# Patient Record
Sex: FEMALE | Race: WHITE | NOT HISPANIC OR LATINO | Employment: OTHER | URBAN - METROPOLITAN AREA
[De-identification: names, ages, dates, MRNs, and addresses within clinical notes are randomized per-mention and may not be internally consistent; named-entity substitution may affect disease eponyms.]

---

## 2017-01-09 RX ORDER — AMOXICILLIN 500 MG/1
500 CAPSULE ORAL 3 TIMES DAILY
Qty: 30 CAPSULE | Refills: 0 | Status: ON HOLD | OUTPATIENT
Start: 2017-01-09 | End: 2017-01-10

## 2017-01-10 ENCOUNTER — HOSPITAL ENCOUNTER (OUTPATIENT)
Facility: HOSPITAL | Age: 82
Setting detail: OBSERVATION
LOS: 1 days | Discharge: HOME | End: 2017-01-11
Attending: EMERGENCY MEDICINE | Admitting: INTERNAL MEDICINE

## 2017-01-10 DIAGNOSIS — E87.20 LACTIC ACIDOSIS: ICD-10-CM

## 2017-01-10 DIAGNOSIS — K92.2 ACUTE GI BLEEDING: ICD-10-CM

## 2017-01-10 DIAGNOSIS — E87.6 HYPOKALEMIA: Primary | ICD-10-CM

## 2017-01-10 PROBLEM — N17.9 AKI (ACUTE KIDNEY INJURY) (HCC): Status: ACTIVE | Noted: 2017-01-10

## 2017-01-10 PROBLEM — K62.5 RECTAL BLEEDING: Status: ACTIVE | Noted: 2017-01-10

## 2017-01-10 LAB
ABO GROUP BLD: NORMAL
ALBUMIN SERPL-MCNC: 4.6 G/DL (ref 3.5–5.2)
ALBUMIN/GLOB SERPL: 1.5 G/DL
ALP SERPL-CCNC: 102 U/L (ref 39–117)
ALT SERPL W P-5'-P-CCNC: 7 U/L (ref 1–33)
ANION GAP SERPL CALCULATED.3IONS-SCNC: 18.7 MMOL/L
AST SERPL-CCNC: 20 U/L (ref 1–32)
BASOPHILS # BLD AUTO: 0.01 10*3/MM3 (ref 0–0.2)
BASOPHILS NFR BLD AUTO: 0.1 % (ref 0–1.5)
BILIRUB SERPL-MCNC: 1.1 MG/DL (ref 0.1–1.2)
BLD GP AB SCN SERPL QL: NEGATIVE
BUN BLD-MCNC: 16 MG/DL (ref 8–23)
BUN/CREAT SERPL: 13.4 (ref 7–25)
CALCIUM SPEC-SCNC: 10.2 MG/DL (ref 8.6–10.5)
CHLORIDE SERPL-SCNC: 86 MMOL/L (ref 98–107)
CO2 SERPL-SCNC: 27.3 MMOL/L (ref 22–29)
CREAT BLD-MCNC: 1.19 MG/DL (ref 0.57–1)
D-LACTATE SERPL-SCNC: 1.9 MMOL/L (ref 0.5–2)
D-LACTATE SERPL-SCNC: 3.7 MMOL/L (ref 0.5–2)
DEPRECATED RDW RBC AUTO: 45.2 FL (ref 37–54)
EOSINOPHIL # BLD AUTO: 0.06 10*3/MM3 (ref 0–0.7)
EOSINOPHIL NFR BLD AUTO: 0.8 % (ref 0.3–6.2)
ERYTHROCYTE [DISTWIDTH] IN BLOOD BY AUTOMATED COUNT: 13.7 % (ref 11.7–13)
GFR SERPL CREATININE-BSD FRML MDRD: 43 ML/MIN/1.73
GLOBULIN UR ELPH-MCNC: 3 GM/DL
GLUCOSE BLD-MCNC: 196 MG/DL (ref 65–99)
HCT VFR BLD AUTO: 37 % (ref 35.6–45.5)
HCT VFR BLD AUTO: 40.8 % (ref 35.6–45.5)
HGB BLD-MCNC: 12.3 G/DL (ref 11.9–15.5)
HGB BLD-MCNC: 13.8 G/DL (ref 11.9–15.5)
HOLD SPECIMEN: NORMAL
HOLD SPECIMEN: NORMAL
IMM GRANULOCYTES # BLD: 0.02 10*3/MM3 (ref 0–0.03)
IMM GRANULOCYTES NFR BLD: 0.3 % (ref 0–0.5)
LYMPHOCYTES # BLD AUTO: 1.48 10*3/MM3 (ref 0.9–4.8)
LYMPHOCYTES NFR BLD AUTO: 19.7 % (ref 19.6–45.3)
MCH RBC QN AUTO: 30.9 PG (ref 26.9–32)
MCHC RBC AUTO-ENTMCNC: 33.8 G/DL (ref 32.4–36.3)
MCV RBC AUTO: 91.3 FL (ref 80.5–98.2)
MONOCYTES # BLD AUTO: 0.91 10*3/MM3 (ref 0.2–1.2)
MONOCYTES NFR BLD AUTO: 12.1 % (ref 5–12)
NEUTROPHILS # BLD AUTO: 5.03 10*3/MM3 (ref 1.9–8.1)
NEUTROPHILS NFR BLD AUTO: 67 % (ref 42.7–76)
PLATELET # BLD AUTO: 186 10*3/MM3 (ref 140–500)
PMV BLD AUTO: 13.2 FL (ref 6–12)
POTASSIUM BLD-SCNC: 3.3 MMOL/L (ref 3.5–5.2)
PROT SERPL-MCNC: 7.6 G/DL (ref 6–8.5)
RBC # BLD AUTO: 4.47 10*6/MM3 (ref 3.9–5.2)
RH BLD: POSITIVE
SODIUM BLD-SCNC: 132 MMOL/L (ref 136–145)
WBC NRBC COR # BLD: 7.51 10*3/MM3 (ref 4.5–10.7)
WHOLE BLOOD HOLD SPECIMEN: NORMAL
WHOLE BLOOD HOLD SPECIMEN: NORMAL

## 2017-01-10 PROCEDURE — 85025 COMPLETE CBC W/AUTO DIFF WBC: CPT | Performed by: EMERGENCY MEDICINE

## 2017-01-10 PROCEDURE — 96361 HYDRATE IV INFUSION ADD-ON: CPT

## 2017-01-10 PROCEDURE — 25810000003 SODIUM CHLORIDE 0.9 % WITH KCL 20 MEQ 20-0.9 MEQ/L-% SOLUTION: Performed by: INTERNAL MEDICINE

## 2017-01-10 PROCEDURE — 86901 BLOOD TYPING SEROLOGIC RH(D): CPT

## 2017-01-10 PROCEDURE — 83605 ASSAY OF LACTIC ACID: CPT | Performed by: EMERGENCY MEDICINE

## 2017-01-10 PROCEDURE — 86900 BLOOD TYPING SEROLOGIC ABO: CPT

## 2017-01-10 PROCEDURE — G0378 HOSPITAL OBSERVATION PER HR: HCPCS

## 2017-01-10 PROCEDURE — 99222 1ST HOSP IP/OBS MODERATE 55: CPT | Performed by: INTERNAL MEDICINE

## 2017-01-10 PROCEDURE — 85014 HEMATOCRIT: CPT | Performed by: INTERNAL MEDICINE

## 2017-01-10 PROCEDURE — 80053 COMPREHEN METABOLIC PANEL: CPT | Performed by: EMERGENCY MEDICINE

## 2017-01-10 PROCEDURE — 85018 HEMOGLOBIN: CPT | Performed by: INTERNAL MEDICINE

## 2017-01-10 PROCEDURE — 86850 RBC ANTIBODY SCREEN: CPT

## 2017-01-10 PROCEDURE — 96374 THER/PROPH/DIAG INJ IV PUSH: CPT

## 2017-01-10 PROCEDURE — 99285 EMERGENCY DEPT VISIT HI MDM: CPT

## 2017-01-10 RX ORDER — SODIUM CHLORIDE 0.9 % (FLUSH) 0.9 %
1-10 SYRINGE (ML) INJECTION AS NEEDED
Status: DISCONTINUED | OUTPATIENT
Start: 2017-01-10 | End: 2017-01-11 | Stop reason: HOSPADM

## 2017-01-10 RX ORDER — ONDANSETRON 2 MG/ML
4 INJECTION INTRAMUSCULAR; INTRAVENOUS EVERY 6 HOURS PRN
Status: DISCONTINUED | OUTPATIENT
Start: 2017-01-10 | End: 2017-01-11 | Stop reason: HOSPADM

## 2017-01-10 RX ORDER — POTASSIUM CHLORIDE 750 MG/1
40 CAPSULE, EXTENDED RELEASE ORAL ONCE
Status: COMPLETED | OUTPATIENT
Start: 2017-01-10 | End: 2017-01-10

## 2017-01-10 RX ORDER — PANTOPRAZOLE SODIUM 40 MG/1
40 TABLET, DELAYED RELEASE ORAL
Status: DISCONTINUED | OUTPATIENT
Start: 2017-01-11 | End: 2017-01-11 | Stop reason: HOSPADM

## 2017-01-10 RX ORDER — SODIUM CHLORIDE 9 MG/ML
125 INJECTION, SOLUTION INTRAVENOUS CONTINUOUS
Status: DISCONTINUED | OUTPATIENT
Start: 2017-01-10 | End: 2017-01-10

## 2017-01-10 RX ORDER — SODIUM CHLORIDE 0.9 % (FLUSH) 0.9 %
10 SYRINGE (ML) INJECTION AS NEEDED
Status: DISCONTINUED | OUTPATIENT
Start: 2017-01-10 | End: 2017-01-11 | Stop reason: HOSPADM

## 2017-01-10 RX ORDER — BUSPIRONE HYDROCHLORIDE 15 MG/1
15 TABLET ORAL EVERY 12 HOURS SCHEDULED
Status: DISCONTINUED | OUTPATIENT
Start: 2017-01-10 | End: 2017-01-11 | Stop reason: HOSPADM

## 2017-01-10 RX ORDER — CETIRIZINE HYDROCHLORIDE 10 MG/1
5 TABLET ORAL DAILY
Status: DISCONTINUED | OUTPATIENT
Start: 2017-01-10 | End: 2017-01-11 | Stop reason: HOSPADM

## 2017-01-10 RX ORDER — SODIUM CHLORIDE AND POTASSIUM CHLORIDE 150; 900 MG/100ML; MG/100ML
75 INJECTION, SOLUTION INTRAVENOUS CONTINUOUS
Status: DISCONTINUED | OUTPATIENT
Start: 2017-01-10 | End: 2017-01-11

## 2017-01-10 RX ORDER — ACETAMINOPHEN 325 MG/1
650 TABLET ORAL EVERY 4 HOURS PRN
Status: DISCONTINUED | OUTPATIENT
Start: 2017-01-10 | End: 2017-01-11 | Stop reason: HOSPADM

## 2017-01-10 RX ORDER — PANTOPRAZOLE SODIUM 40 MG/10ML
80 INJECTION, POWDER, LYOPHILIZED, FOR SOLUTION INTRAVENOUS ONCE
Status: COMPLETED | OUTPATIENT
Start: 2017-01-10 | End: 2017-01-10

## 2017-01-10 RX ADMIN — CETIRIZINE HYDROCHLORIDE 5 MG: 10 TABLET, FILM COATED ORAL at 15:14

## 2017-01-10 RX ADMIN — POTASSIUM CHLORIDE 40 MEQ: 750 CAPSULE, EXTENDED RELEASE ORAL at 19:07

## 2017-01-10 RX ADMIN — METOPROLOL TARTRATE 25 MG: 25 TABLET ORAL at 18:35

## 2017-01-10 RX ADMIN — POTASSIUM CHLORIDE AND SODIUM CHLORIDE 75 ML/HR: 900; 150 INJECTION, SOLUTION INTRAVENOUS at 15:15

## 2017-01-10 RX ADMIN — BUSPIRONE HYDROCHLORIDE 15 MG: 15 TABLET ORAL at 20:58

## 2017-01-10 RX ADMIN — SODIUM CHLORIDE 125 ML/HR: 9 INJECTION, SOLUTION INTRAVENOUS at 08:01

## 2017-01-10 RX ADMIN — PANTOPRAZOLE SODIUM 80 MG: 40 INJECTION, POWDER, FOR SOLUTION INTRAVENOUS at 08:04

## 2017-01-10 RX ADMIN — BUSPIRONE HYDROCHLORIDE 15 MG: 15 TABLET ORAL at 15:15

## 2017-01-10 NOTE — ED PROVIDER NOTES
EMERGENCY DEPARTMENT ENCOUNTER    CHIEF COMPLAINT  Chief Complaint: rectal bleeding  History given by: patient, family  History limited by: none  Room Number: 24/24  PMD: Raji Richardson MD  Cardiologist- Dr Andrade    HPI:  Pt is a 89 y.o. female who presents complaining of rectal bleeding. About 3 days ago, pt had 2 episodes during which she passed stools with red blood. Last night, she had 2 small episodes in which she passed black stool with dark red blood. She denies having rectal pain, N/V/D, fevers, chills, abd pain, pain and difficulty with urination, chest pain, and trouble breathing. Per family, pt was taken off of coumadin 3 months ago and is currently on 81mg ASA. They also state that pt had most recent colonoscopy over 6 years ago and has previous hx of GI bleeding. Pt has no other complaints at this time.     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   • Arthritis of knee 04/13/2016   • Cramp of limb 04/13/2016   • Congestive heart failure 04/13/2016   • Depression 04/13/2016   • Diverticulosis of intestine 04/13/2016   • Dysuria 04/13/2016   • Gastroesophageal reflux disease 04/13/2016   • Fever 04/13/2016   • Hyperglycemia 04/13/2016   • Hyperlipidemia 04/13/2016   • Impacted cerumen 04/13/2016   • Knee pain 04/13/2016   • Mitral valve insufficiency 04/13/2016   • Osteoporosis 04/13/2016   • Pacemaker 04/13/2016   • Urinary incontinence 04/13/2016   • Atrial fibrillation 04/13/2016   • Chronic a-fib 06/19/2016   • HTN (hypertension) 06/19/2016   • Gait instability  06/19/2016     Resolved Ambulatory Problems     Diagnosis Date Noted   • Anemia due to chronic kidney disease 04/13/2016   • Decubitus ulcer of sacral area 04/13/2016   • Iron deficiency anemia 04/13/2016   • Lower gastrointestinal hemorrhage 04/13/2016   • Sore throat 04/13/2016   • Acute lower GI bleeding 06/18/2016     Past Medical History   Diagnosis Date   • Allergic rhinitis    • Anemia in CKD (chronic kidney disease)    • Anxiety    • CHF (congestive heart failure)    • Diverticulosis    • GERD (gastroesophageal reflux disease)    • High risk medication use    • Hypertension    • Impacted cerumen of right ear    • Lower GI bleed    • Mitral regurgitation    • RBBB (right bundle branch block)    • Rheumatoid arthritis    • Thrombocytopenia          PAST SURGICAL HISTORY  Past Surgical History   Procedure Laterality Date   • Pacemaker implantation     • Colonoscopy       12- DR HORTA RECOMMENDED/DONE AT [facility]         FAMILY HISTORY  Family History   Problem Relation Age of Onset   • Cancer Mother    • Cancer Father    • Diabetes Daughter    • Cancer Other    • Diabetes Other    • Hypertension Other    • Heart attack Other          SOCIAL HISTORY  Social History     Social History   • Marital status:      Spouse name: N/A   • Number of children: N/A   • Years of education: N/A     Occupational History   • Not on file.     Social History Main Topics   • Smoking status: Former Smoker     Years: 5.00   • Smokeless tobacco: Never Used      Comment: CAFFEINE USE   • Alcohol use No   • Drug use: No   • Sexual activity: Defer     Other Topics Concern   • Not on file     Social History Narrative         ALLERGIES  Alendronate; Atorvastatin; Colesevelam hcl; Ezetimibe; Hydrochlorothiazide; Nitrofurantoin; and Statins        REVIEW OF SYSTEMS  Review of Systems   Constitutional: Negative for chills and fever.   HENT: Negative for sore throat and trouble swallowing.    Eyes: Negative for visual disturbance.    Respiratory: Negative for cough and shortness of breath.    Cardiovascular: Negative for chest pain, palpitations and leg swelling.   Gastrointestinal: Positive for blood in stool (red blood mixed with stool). Negative for abdominal pain, diarrhea and vomiting.        Black stool   Endocrine: Negative.    Genitourinary: Negative for decreased urine volume, dysuria and frequency.   Musculoskeletal: Negative for neck pain.   Skin: Negative for rash.   Allergic/Immunologic: Negative.    Neurological: Negative for syncope, weakness, numbness and headaches.   Hematological: Negative.    Psychiatric/Behavioral: Negative.    All other systems reviewed and are negative.           PHYSICAL EXAM  ED Triage Vitals   Temp Heart Rate Resp BP SpO2   01/10/17 0714 01/10/17 0714 01/10/17 0714 01/10/17 0728 01/10/17 0714   97 °F (36.1 °C) 65 16 156/74 97 % WNL      Temp src Heart Rate Source Patient Position BP Location FiO2 (%)   01/10/17 0714 01/10/17 0714 01/10/17 0728 01/10/17 0732 --   Tympanic Monitor Lying Left arm        Physical Exam   Constitutional: She is oriented to person, place, and time. She appears distressed (mild).   HENT:   Head: Normocephalic and atraumatic.   Eyes: EOM are normal.   Neck: Normal range of motion. Neck supple.   Cardiovascular: Normal rate, regular rhythm, normal heart sounds and intact distal pulses.    No murmur heard.  Pulses:       Posterior tibial pulses are 2+ on the right side, and 2+ on the left side.   Pulmonary/Chest: Effort normal and breath sounds normal. No respiratory distress.   Abdominal: Soft. Bowel sounds are normal. There is no tenderness. There is no rebound and no guarding.   Genitourinary: Rectal exam shows guaiac positive stool (heme positive dark red maroon stool in rectal vault).   Genitourinary Comments: no hemorrhoids     performed rectal exam with chaperone at pt's bedside   Musculoskeletal: Normal range of motion. She exhibits no edema.   Neurological: She is alert  and oriented to person, place, and time. She has normal sensation and normal strength.   Skin: Skin is warm and dry.   Psychiatric: Affect normal.   Nursing note and vitals reviewed.          LAB RESULTS  Recent Results (from the past 24 hour(s))   Comprehensive Metabolic Panel    Collection Time: 01/10/17  7:42 AM   Result Value Ref Range    Glucose 196 (H) 65 - 99 mg/dL    BUN 16 8 - 23 mg/dL    Creatinine 1.19 (H) 0.57 - 1.00 mg/dL    Sodium 132 (L) 136 - 145 mmol/L    Potassium 3.3 (L) 3.5 - 5.2 mmol/L    Chloride 86 (L) 98 - 107 mmol/L    CO2 27.3 22.0 - 29.0 mmol/L    Calcium 10.2 8.6 - 10.5 mg/dL    Total Protein 7.6 6.0 - 8.5 g/dL    Albumin 4.60 3.50 - 5.20 g/dL    ALT (SGPT) 7 1 - 33 U/L    AST (SGOT) 20 1 - 32 U/L    Alkaline Phosphatase 102 39 - 117 U/L    Total Bilirubin 1.1 0.1 - 1.2 mg/dL    eGFR Non African Amer 43 (L) >60 mL/min/1.73    Globulin 3.0 gm/dL    A/G Ratio 1.5 g/dL    BUN/Creatinine Ratio 13.4 7.0 - 25.0    Anion Gap 18.7 mmol/L   Type & Screen    Collection Time: 01/10/17  7:42 AM   Result Value Ref Range    ABO Type A     RH type Positive     Antibody Screen Negative    CBC Auto Differential    Collection Time: 01/10/17  7:42 AM   Result Value Ref Range    WBC 7.51 4.50 - 10.70 10*3/mm3    RBC 4.47 3.90 - 5.20 10*6/mm3    Hemoglobin 13.8 11.9 - 15.5 g/dL    Hematocrit 40.8 35.6 - 45.5 %    MCV 91.3 80.5 - 98.2 fL    MCH 30.9 26.9 - 32.0 pg    MCHC 33.8 32.4 - 36.3 g/dL    RDW 13.7 (H) 11.7 - 13.0 %    RDW-SD 45.2 37.0 - 54.0 fl    MPV 13.2 (H) 6.0 - 12.0 fL    Platelets 186 140 - 500 10*3/mm3    Neutrophil % 67.0 42.7 - 76.0 %    Lymphocyte % 19.7 19.6 - 45.3 %    Monocyte % 12.1 (H) 5.0 - 12.0 %    Eosinophil % 0.8 0.3 - 6.2 %    Basophil % 0.1 0.0 - 1.5 %    Immature Grans % 0.3 0.0 - 0.5 %    Neutrophils, Absolute 5.03 1.90 - 8.10 10*3/mm3    Lymphocytes, Absolute 1.48 0.90 - 4.80 10*3/mm3    Monocytes, Absolute 0.91 0.20 - 1.20 10*3/mm3    Eosinophils, Absolute 0.06 0.00 - 0.70  10*3/mm3    Basophils, Absolute 0.01 0.00 - 0.20 10*3/mm3    Immature Grans, Absolute 0.02 0.00 - 0.03 10*3/mm3   Lactic Acid, Plasma    Collection Time: 01/10/17  7:55 AM   Result Value Ref Range    Lactate 3.7 (C) 0.5 - 2.0 mmol/L       Ordered the above labs and reviewed the results.        PROCEDURES  Procedures      PROGRESS AND CONSULTS  ED Course     7:50 AM- Ordered IV fluids and protonix for GI bleed. Ordered blood work, lactic acid, and type and screen for further evaluation.     8:40 AM- Sent call out to [facility]. Admission decision time= 0840    8:41 AM- Lactic acid is elevated but is not suspicious for sepsis.     9:12 AM- Discussed case with Dr Titus ([facility] hospitalist)  Reviewed history, exam, results and treatments.  Discussed concerns and plan of care. Dr Titus accepts pt to be admitted to telemetry.    9:19 AM- Rechecked pt. BP is 127/59. HR is 60. O2 sat is 96% on room air. Discussed with pt about all pertinent results including heme positive maroon stool on rectal exam, normal hemoglobin, and elevated lactic acid. Discussed pt admission for further care and observation. Pt verbalizes understanding and agrees with plan. Pt is ready for admission.    9:40 AM- Reviewed orthostatics which are positive.       MEDICAL DECISION MAKING  Results were reviewed/discussed with the patient.     MDM  Number of Diagnoses or Management Options  Acute GI bleeding:   Hypokalemia:   Lactic acidosis:      Amount and/or Complexity of Data Reviewed  Clinical lab tests: ordered and reviewed (WBC= 7.51, hgb= 13.8, potassium= 3.3, lactic acid= 3.7)  Decide to obtain previous medical records or to obtain history from someone other than the patient: yes  Review and summarize past medical records: yes (Pt was admitted on 6/18/16 for acute GI bleed. At the time, she was seen by Dr Ruffin (GI). Pt also has past hx of diverticulosis and AVMs. )  Discuss the patient with other providers: yes (Dr Titus ([facility] hospitalist) will admit.  )    Patient Progress  Patient progress: stable           DIAGNOSIS  Final diagnoses:   Hypokalemia   Acute GI bleeding   Lactic acidosis         DISPOSITION  Admitted- telemetry    Discussed treatment plan and reason for admission with pt and admitting physician.  Pt voiced understanding of the plan for admission for further testing/treatment as needed.         Latest Documented Vital Signs:  As of 9:41 AM  BP- 138/71 HR- 62 Temp- 97.4 °F (36.3 °C) (Tympanic) O2 sat- 97%        --  Documentation assistance provided by norman Rodriguez for Dr Olson.  Information recorded by the scribe was done at my direction and has been verified and validated by me.       Arik Rodriguez  01/10/17 1005       Yara Olson MD  01/10/17 5091

## 2017-01-10 NOTE — CONSULTS
[facility]  Initial Inpatient Consult Note    Referring Provider: Dr. Oc England    Reason for Consultation: Rectal bleed    Subjective     History of present illness:  The patient is a very pleasant 89-year-old female with a history of gastrointestinal bleeding ×4 in her life with the most recent episode occurring in June 2016.  She presented with rectal bleeding three times since Saturday including once yesterday.  Her hemoglobin upon arrival is stable at 13.8 compared to last check in September 2016 when her hemoglobin was 12.9.  In June, she had rectal bleeding while on Coumadin with an INR 2.1 but her hemoglobin remained within normal limits and endoscopic evaluation was not performed.  Dr. Andrade, her cardiologist, subsequently stopped her Coumadin therapy and she is currently only on aspirin 81 mg daily.  In 2005, GI bleed, no scope done.  2011 rectal bleeding, diverticulosis, no source found.  Rectal bleeding 2015 diverticulosis, cecal AVMs treated with APC.   She denies other GI complaints, specifically no abdominal pain, dysphagia, odynophagia, skin lesions, nausea, vomiting, or melena.          Past Medical History:  Past Medical History   Diagnosis Date   • Allergic rhinitis    • Anemia in CKD (chronic kidney disease)    • Anxiety    • Arteriovenous malformation    • Atrial fibrillation    • CHF (congestive heart failure)    • Depression    • Diverticulosis    • Dysuria    • Fever    • GERD (gastroesophageal reflux disease)    • High risk medication use    • Hyperglycemia    • Hyperlipidemia    • Hypertension    • Impacted cerumen of right ear    • Iron deficiency anemia    • Knee pain    • Lower GI bleed    • Mitral regurgitation    • Osteoporosis    • RBBB (right bundle branch block)    • Rheumatoid arthritis    • Thrombocytopenia    • Urinary incontinence        Past Surgical History:  Past Surgical History   Procedure Laterality Date   • Pacemaker implantation     •  Colonoscopy       12- DR HORTA RECOMMENDED/DONE AT [facility]        Social History:   Social History   Substance Use Topics   • Smoking status: Former Smoker     Years: 5.00   • Smokeless tobacco: Never Used      Comment: CAFFEINE USE   • Alcohol use No        Family History:  Family History   Problem Relation Age of Onset   • Cancer Mother    • Cancer Father    • Diabetes Daughter    • Cancer Other    • Diabetes Other    • Hypertension Other    • Heart attack Other        Home Meds:  Prescriptions Prior to Admission   Medication Sig Dispense Refill Last Dose   • Ascorbic Acid (VITAMIN C) 500 MG capsule Take 1 capsule by mouth daily.   Past Week at Unknown time   • aspirin 81 MG EC tablet Take 1 tablet by mouth daily. 90 tablet 3 1/9/2017 at Unknown time   • busPIRone (BUSPAR) 15 MG tablet TAKE 1 TABLET TWICE DAILY 180 tablet 5 1/9/2017 at Unknown time   • Cholecalciferol (VITAMIN D-3) 1000 UNITS capsule Take 1 capsule by mouth daily.   1/9/2017 at Unknown time   • furosemide (LASIX) 40 MG tablet Take 1 tablet by mouth Daily. 90 tablet 3 1/9/2017 at Unknown time   • metoprolol tartrate (LOPRESSOR) 50 MG tablet Take 1 tablet by mouth 2 (two) times a day. (Patient taking differently: Take 25 mg by mouth 2 (two) times a day.) 180 tablet 3 1/9/2017 at Unknown time   • Multiple Vitamins-Minerals (CENTRUM SILVER) tablet Take 1 tablet by mouth daily.   1/9/2017 at Unknown time   • omeprazole (PriLOSEC) 40 MG capsule TAKE 1 CAPSULE BY MOUTH DAILY. 90 capsule 1 1/9/2017 at Unknown time   • Potassium Gluconate 550 MG tablet Take 1 tablet by mouth daily.   1/9/2017 at Unknown time   • Acetaminophen (TYLENOL EXTRA STRENGTH PO) Take  by mouth.   Unknown at Unknown time   • loratadine (CLARITIN) 10 MG tablet Take 10 mg by mouth daily.   Unknown at Unknown time       Current Meds:     busPIRone 15 mg Oral Q12H   cetirizine 5 mg Oral Daily   metoprolol tartrate 25 mg Oral BID   [START ON 1/11/2017] pantoprazole 40 mg Oral Q AM        Allergies:  Allergies   Allergen Reactions   • Alendronate    • Atorvastatin    • Colesevelam Hcl    • Ezetimibe    • Hydrochlorothiazide    • Nitrofurantoin    • Statins        Review of Systems  10 point review of systems is otherwise negative, pertinent positives are found in the history of present illness or subjective portion of this dictation     Objective     Vital Signs  Temp:  [97 °F (36.1 °C)-97.4 °F (36.3 °C)] 97.4 °F (36.3 °C)  Heart Rate:  [59-66] 60  Resp:  [14-16] 16  BP: (110-156)/(63-89) 140/68    Physical Exam:  General Appearance:    Alert, cooperative, in no acute distress   Head:    Normocephalic, without obvious abnormality, atraumatic   Eyes:            Lids and lashes normal, conjunctivae and sclerae normal, no   icterus   Throat:   No oral lesions, no thrush, oral mucosa moist   Neck:   No adenopathy, supple, trachea midline, no thyromegaly, no   carotid bruit, no JVD   Lungs:     Clear to auscultation,respirations regular, even and                   unlabored    Heart:    Regular rhythm and normal rate, normal S1 and S2, no            murmur, no gallop, no rub, no click   Chest Wall:    No abnormalities observed   Abdomen:     Normal bowel sounds, no masses, no organomegaly, soft        non-tender, non-distended, no guarding, no rebound                 tenderness   Rectal:     Deferred   Extremities:   no edema, no cyanosis, no redness   Skin:   No bleeding, bruising or rash   Lymph nodes:   No palpable adenopathy   Psychiatric:  Judgement and insight: normal   Orientation to person place and time: normal   Mood and affect: normal     Results Review:   I reviewed the patient's new clinical results.      Results from last 7 days  Lab Units 01/10/17  0742   WBC 10*3/mm3 7.51   HEMOGLOBIN g/dL 13.8   HEMATOCRIT % 40.8   PLATELETS 10*3/mm3 186         Results from last 7 days  Lab Units 01/10/17  0742   SODIUM mmol/L 132*   POTASSIUM mmol/L 3.3*   CHLORIDE mmol/L 86*   TOTAL CO2 mmol/L  27.3   BUN mg/dL 16   CREATININE mg/dL 1.19*   CALCIUM mg/dL 10.2   BILIRUBIN mg/dL 1.1   ALK PHOS U/L 102   ALT (SGPT) U/L 7   AST (SGOT) U/L 20   GLUCOSE mg/dL 196*               0  Lab Value Date/Time   LIPASE 73 (H) 06/10/2015 1008       Radiology:  Imaging Results (last 72 hours)     ** No results found for the last 72 hours. **          Assessment/Plan     Principal Problem:    Rectal bleeding  Active Problems:    Diverticulosis of intestine    Chronic a-fib    HTN (hypertension)    Hypokalemia    ALLY (acute kidney injury)    Acidosis          A/P  1.  Acute lower GI bleeding- likely diverticular, possibly hemorrhoidal, normal hemoglobin   2. diverticulosis  3. history of GI bleeding    - Hemodynamically stable with hemoglobin within normal limits  - Monitor for continued rectal bleeding  - Agree with serial H&H's and transfusions if needed.  - Discuss with patient and family we will defer colonoscopy at this time and proceed with conservative management      I discussed the patients findings and my recommendations with patient    Phill Ruffin MD  [facility]  01/10/17

## 2017-01-10 NOTE — IP AVS SNAPSHOT
AFTER VISIT SUMMARY             Cecelia Enriquez           About your hospitalization     You were admitted on:  January 10, 2017 You last received care in the:  [address]       Procedures & Surgeries         Medications    If you or your caregiver advised us that you are currently taking a medication and that medication is marked below as “Resume”, this simply indicates that we have reviewed those medications to make sure our new therapy recommendations do not interfere.  If you have concerns about medications other than those new ones which we are prescribing today, please consult the physician who prescribed them (or your primary physician).  Our review of your home medications is not meant to indicate that we are directing their use.             Your Medications      CHANGE how you take these medications     metoprolol tartrate 50 MG tablet   Take 1 tablet by mouth 2 (two) times a day.   Last time this was given:  1/11/2017  8:34 AM   According to our records, you may have been taking this medication differently.   Commonly known as:  LOPRESSOR   What changed:  how much to take   Next Dose Due:  This evening 1/11/2017             CONTINUE taking these medications     busPIRone 15 MG tablet   TAKE 1 TABLET TWICE DAILY   Last time this was given:  1/11/2017  8:34 AM   Commonly known as:  BUSPAR   Next Dose Due:  1/12/17 evening dose.           CENTRUM SILVER tablet   Take 1 tablet by mouth daily.   Next Dose Due:  1/11/2017   Notes to Patient:  You can take this today when you get home.           furosemide 40 MG tablet   Take 1 tablet by mouth Daily.   Commonly known as:  LASIX   Next Dose Due:  1/11/2017   Notes to Patient:  You can take this today when you get home.           loratadine 10 MG tablet   Take 10 mg by mouth daily.   Commonly known as:  CLARITIN   Next Dose Due:  1/11/2017   Notes to Patient:  You can take this today when you get home.           omeprazole 40 MG capsule      TAKE 1 CAPSULE BY MOUTH DAILY.   Commonly known as:  priLOSEC   Next Dose Due:  1/11/2017   Notes to Patient:  You can take this today when you get home.           Potassium Gluconate 550 MG tablet   Take 1 tablet by mouth daily.   Next Dose Due:  1/11/2017   Notes to Patient:  You can take this today when you get home.           TYLENOL EXTRA STRENGTH PO   Take  by mouth.   Notes to Patient:  Resume home med schedule.           Vitamin C 500 MG capsule   Take 1 capsule by mouth daily.   Next Dose Due:  1/11/2017   Notes to Patient:  You can take this today when you get home.           Vitamin D-3 1000 UNITS capsule   Take 1 capsule by mouth daily.   Next Dose Due:  1/11/2017   Notes to Patient:  You can take this today when you get home.             STOP taking these medications     aspirin 81 MG EC tablet                      Your Medications      Your Medication List           Morning Noon Evening Bedtime As Needed    busPIRone 15 MG tablet   TAKE 1 TABLET TWICE DAILY   Commonly known as:  BUSPAR                                      CENTRUM SILVER tablet   Take 1 tablet by mouth daily.   Notes to Patient:  You can take this today when you get home.                                   furosemide 40 MG tablet   Take 1 tablet by mouth Daily.   Commonly known as:  LASIX   Notes to Patient:  You can take this today when you get home.                                   loratadine 10 MG tablet   Take 10 mg by mouth daily.   Commonly known as:  CLARITIN   Notes to Patient:  You can take this today when you get home.                                   metoprolol tartrate 50 MG tablet   Take 1 tablet by mouth 2 (two) times a day.   Commonly known as:  LOPRESSOR                                      omeprazole 40 MG capsule   TAKE 1 CAPSULE BY MOUTH DAILY.   Commonly known as:  priLOSEC   Notes to Patient:  You can take this today when you get home.                                   Potassium Gluconate 550 MG tablet   Take 1  tablet by mouth daily.   Notes to Patient:  You can take this today when you get home.                                   TYLENOL EXTRA STRENGTH PO   Take  by mouth.   Notes to Patient:  Resume home med schedule.                                   Vitamin C 500 MG capsule   Take 1 capsule by mouth daily.   Notes to Patient:  You can take this today when you get home.                                   Vitamin D-3 1000 UNITS capsule   Take 1 capsule by mouth daily.   Notes to Patient:  You can take this today when you get home.                                            Instructions for After Discharge        Activity Instructions     Activity as tolerated.             Diet Instructions     Regular bland diet             Discharge References/Attachments     HYPOKALEMIA (ENGLISH)    GASTROINTESTINAL BLEEDING, EASY-TO-READ (ENGLISH)       Follow-ups for After Discharge        Follow-up Information     Follow up with Raji Richardson MD .    Specialty:  Family Medicine    Contact information:    [address] [facility] [address] [phone]        Referrals and Follow-ups to Schedule     Follow-Up    As directed    pcp 1-2 weeks, cards as scheduled in couple weeks, gi per their recs   Follow Up Details:  pcp 1-2 weeks, cards as scheduled in couple weeks, gi per their recs             Scheduled Appointments     Jan 13, 2017  2:00 PM EST   Office Visit with Raji Richardson MD   [facility] FAMILY AND INTERNAL MED (--)    [address] [facility] [address] [phone]           Arrive 15 minutes prior to appointment.            Jan 19, 2017  1:45 PM EST   PACEMAKER IN OFFICE with OMAR EVANS   [facility] CARDIOLOGY (--)    [address] [facility] [place] [id number]   [phone]            Jan 19, 2017  2:00 PM EST   Follow Up with Guy Andrade MD   [facility] CARDIOLOGY (--)    [address] [facility]. [address] [place] [address]  [id number] [phone]           Arrive 15 minutes prior to appointment.            Feb 13, 2017 11:00 AM EST   Follow Up with MERCEDEZ Vargas   [facility] GASTRO[facility] (--)    [address]  [facility] [id number] [phone]           Arrive 15 minutes prior to appointment.              MyChart Signup     Our records indicate that you have declined [facility] MyChart signup. If you would like to sign up for magnetUhart, please email [email or web address] or call [phone] to obtain an activation code.         Summary of Your Hospitalization        Reason for Hospitalization     Your primary diagnosis was:  Rectal Bleeding    Your diagnoses also included:  Hypokalemia, Siva (Acute Kidney Injury), Diverticulosis, Atrial Fibrillation (Irregular Heartbeat), High Blood Pressure, Metabolic Disease      Care Providers     Provider Service Role Specialty    Landry Titus MD Internal Medicine Attending Provider Internal Medicine    Phill Ruffin MD -- Consulting Physician  Gastroenterology      Your Allergies  Date Reviewed: 1/10/2017    Allergen Reactions    Alendronate Not Noted         Atorvastatin Not Noted         Colesevelam Hcl Not Noted         Ezetimibe Not Noted         Hydrochlorothiazide Not Noted         Nitrofurantoin Not Noted         Statins Not Noted      Patient Belongings Returned     Document Return of Belongings Flowsheet     Were the patient bedside belongings sent home?   Yes   Belongings Retrieved from Security & Sent Home   N/A    Belongings Sent to Safe   --   Medications Retrieved from Pharmacy & Sent Home   N/A              More Information      Hypokalemia  Hypokalemia means that the amount of potassium in the blood is lower than normal. Potassium is a chemical, called an electrolyte, that helps regulate the amount of fluid in the body. It also stimulates muscle contraction and helps nerves function properly. Most of the body's  potassium is inside of cells, and only a very small amount is in the blood. Because the amount in the blood is so small, minor changes can be life-threatening.  CAUSES  · Antibiotics.  · Diarrhea or vomiting.  · Using laxatives too much, which can cause diarrhea.  · Chronic kidney disease.  · Water pills (diuretics).  · Eating disorders (bulimia).  · Low magnesium level.  · Sweating a lot.  SIGNS AND SYMPTOMS  · Weakness.  · Constipation.  · Fatigue.  · Muscle cramps.  · Mental confusion.  · Skipped heartbeats or irregular heartbeat (palpitations).  · Tingling or numbness.  DIAGNOSIS   Your health care provider can diagnose hypokalemia with blood tests. In addition to checking your potassium level, your health care provider may also check other lab tests.  TREATMENT  Hypokalemia can be treated with potassium supplements taken by mouth or adjustments in your current medicines. If your potassium level is very low, you may need to get potassium through a vein (IV) and be monitored in the hospital. A diet high in potassium is also helpful. Foods high in potassium are:  · Nuts, such as peanuts and pistachios.  · Seeds, such as sunflower seeds and pumpkin seeds.  · Peas, lentils, and lima beans.  · Whole grain and bran cereals and breads.  · Fresh fruit and vegetables, such as apricots, avocado, bananas, cantaloupe, kiwi, oranges, tomatoes, asparagus, and potatoes.  · Orange and tomato juices.  · Red meats.  · Fruit yogurt.  HOME CARE INSTRUCTIONS  · Take all medicines as prescribed by your health care provider.  · Maintain a healthy diet by including nutritious food, such as fruits, vegetables, nuts, whole grains, and lean meats.  · If you are taking a laxative, be sure to follow the directions on the label.  SEEK MEDICAL CARE IF:  · Your weakness gets worse.  · You feel your heart pounding or racing.  · You are vomiting or having diarrhea.  · You are diabetic and having trouble keeping your blood glucose in the normal  range.  SEEK IMMEDIATE MEDICAL CARE IF:  · You have chest pain, shortness of breath, or dizziness.  · You are vomiting or having diarrhea for more than 2 days.  · You faint.  MAKE SURE YOU:   · Understand these instructions.  · Will watch your condition.  · Will get help right away if you are not doing well or get worse.     This information is not intended to replace advice given to you by your health care provider. Make sure you discuss any questions you have with your health care provider.     Document Released: 12/18/2006 Document Revised: 01/08/2016 Document Reviewed: 06/20/2014  [employer] Patient Education ©2016 [employer]          Gastrointestinal Bleeding  Gastrointestinal bleeding is bleeding somewhere along the path that food travels through the body (digestive tract). This path is anywhere between the mouth and the opening of the butt (anus). You may have blood in your throw up (vomit) or in your poop (stools). If there is a lot of bleeding, you may need to stay in the hospital.  HOME CARE  · Only take medicine as told by your doctor.  · Eat foods with fiber such as whole grains, fruits, and vegetables. You can also try eating 1 to 3 prunes a day.  · Drink enough fluids to keep your pee (urine) clear or pale yellow.  GET HELP RIGHT AWAY IF:   · Your bleeding gets worse.  · You feel dizzy, weak, or you pass out (faint).  · You have bad cramps in your back or belly (abdomen).  · You have large blood clumps (clots) in your poop.  · Your problems are getting worse.  MAKE SURE YOU:   · Understand these instructions.  · Will watch your condition.  · Will get help right away if you are not doing well or get worse.     This information is not intended to replace advice given to you by your health care provider. Make sure you discuss any questions you have with your health care provider.     Document Released: 09/26/2009 Document Revised: 12/04/2013 Document Reviewed: 06/06/2016  [employer]  Patient Education ©2016 [employer]            SYMPTOMS OF A STROKE    Call 911 or have someone take you to the Emergency Department if you have any of the following:    · Sudden numbness or weakness of your face, arm or leg especially on one side of the body  · Sudden confusion, diffiiculty speaking or trouble understanding   · Changes in your vision or loss of sight in one eye  · Sudden severe headache with no known cause  · sudden dizziness, trouble walking, loss of balance or coordination    It is important to seek emergency care right away if you have further stroke symptoms. If you get emergency help quickly, the powerful clot-dissolving medicines can reduce the disabilities caused by a stroke.     For more information:    American Stroke Association  1-726-6-STROKE  www.strokeassociation.org           IF YOU SMOKE OR USE TOBACCO PLEASE READ THE FOLLOWING:    Why is smoking bad for me?  Smoking increases the risk of heart disease, lung disease, vascular disease, stroke, and cancer.     If you smoke, STOP!    If you would like more information on quitting smoking, please visit the [employer] website: [email or web address]/healthier-[email or web address]   This link will provide additional resources including the QUIT line and the Beat the Pack support groups.     For more information:    American Cancer Society  (919) 503-5425    American Heart Association  1-728.235.2585               YOU ARE THE MOST IMPORTANT FACTOR IN YOUR RECOVERY.     Follow all instructions carefully.     I have reviewed my discharge instructions with my nurse, including the following information, if applicable:     Information about my illness and diagnosis   Follow up appointments (including lab draws)   Wound Care   Equipment Needs   Medications (new and continuing) along with side effects   Preventative information such as vaccines and smoking cessations   Diet   Pain   I know when to contact my Doctor's office or  seek emergency care      I want my nurse to describe the side effects of my medications: YES NO   If the answer is no, I understand the side effects of my medications: YES NO   My nurse described the side effects of my medications in a way that I could understand: YES NO   I have taken my personal belongings and my own medications with me at discharge: YES NO            I have received this information and my questions have been answered. I have discussed any concerns I see with this plan with the nurse or physician. I understand these instructions.    Signature of Patient or Responsible Person: _____________________________________    Date: _________________  Time: __________________    Signature of Healthcare Provider: _______________________________________  Date: _________________  Time: __________________

## 2017-01-10 NOTE — H&P
Name: Cecelia Enriquez ADMIT: 1/10/2017   : 1927  PCP: Raji Richardson MD    MRN: [id number] LOS: 1 days   AGE/SEX: 89 y.o. female  ROOM: [facility]/     Chief Complaint   Patient presents with   • Rectal Bleeding       Subjective   Patient is a 89 y.o. female presents with the following...    History of Present Illness  Patient is a very pleasant 89-year-old white female with a history of diverticulosis, previous lower GI bleed, atrial fibrillation, hypertension who presented for rectal bleeding.  In 2016 and she was admitted to our service for similar issues and GI was consulted (Dr. Ruffin) and there is no indication for endoscopy.  Her bleeding had resolved.  Her Coumadin was held during the hospitalization.  She followed up with her cardiologist, Dr. Andrade, afterwards and has been discontinued from her Coumadin therapy.  She only continues on aspirin 81 mg daily.  Next    She noted bleeding from her rectum twice on Saturday and then once on Monday.  She called her nurse and was told to come in the emergency room.  When she arrived she was hemodynamically stable and her hemoglobin was stable from last check at 13.8.  He was actually 12.2016.  She denied any abdominal pain.  She states it feels very similar to previous episodes.  There is no melena or hematemesis.  She was found to have hypokalemia of 3.3 and acute kidney injury from her baseline of 0.89 to creatinine level of 1.19 on admission.  She was also found to have a lactic acidosis on admission to 3.7 which improved to 1.9 with IV fluids.  Past Medical History   Diagnosis Date   • Allergic rhinitis    • Anemia in CKD (chronic kidney disease)    • Anxiety    • Arteriovenous malformation    • Atrial fibrillation    • CHF (congestive heart failure)    • Depression    • Diverticulosis    • Dysuria    • Fever    • GERD (gastroesophageal reflux disease)    • High risk medication use    • Hyperglycemia    • Hyperlipidemia    •  Hypertension    • Impacted cerumen of right ear    • Iron deficiency anemia    • Knee pain    • Lower GI bleed    • Mitral regurgitation    • Osteoporosis    • RBBB (right bundle branch block)    • Rheumatoid arthritis    • Thrombocytopenia    • Urinary incontinence      Past Surgical History   Procedure Laterality Date   • Pacemaker implantation     • Colonoscopy       12- DR HORTA RECOMMENDED/DONE AT [facility]     Family History   Problem Relation Age of Onset   • Cancer Mother    • Cancer Father    • Diabetes Daughter    • Cancer Other    • Diabetes Other    • Hypertension Other    • Heart attack Other      Social History   Substance Use Topics   • Smoking status: Former Smoker     Years: 5.00   • Smokeless tobacco: Never Used      Comment: CAFFEINE USE   • Alcohol use No     Prescriptions Prior to Admission   Medication Sig Dispense Refill Last Dose   • amoxicillin (AMOXIL) 500 MG capsule Take 1 capsule by mouth 3 (Three) Times a Day. 30 capsule 0 Past Week at Unknown time   • Ascorbic Acid (VITAMIN C) 500 MG capsule Take 1 capsule by mouth daily.   Past Week at Unknown time   • aspirin 81 MG EC tablet Take 1 tablet by mouth daily. 90 tablet 3 1/9/2017 at Unknown time   • busPIRone (BUSPAR) 15 MG tablet TAKE 1 TABLET TWICE DAILY 180 tablet 5 1/9/2017 at Unknown time   • Cholecalciferol (VITAMIN D-3) 1000 UNITS capsule Take 1 capsule by mouth daily.   1/9/2017 at Unknown time   • diphenhydrAMINE-acetaminophen (TYLENOL PM)  MG tablet per tablet Take 0.5 tablets by mouth daily.   1/9/2017 at Unknown time   • furosemide (LASIX) 40 MG tablet Take 1 tablet by mouth Daily. 90 tablet 3 1/9/2017 at Unknown time   • metoprolol tartrate (LOPRESSOR) 50 MG tablet Take 1 tablet by mouth 2 (two) times a day. (Patient taking differently: Take 25 mg by mouth 2 (two) times a day.) 180 tablet 3 1/9/2017 at Unknown time   • Multiple Vitamins-Minerals (CENTRUM SILVER) tablet Take 1 tablet by mouth daily.   1/9/2017 at  Unknown time   • omeprazole (PriLOSEC) 40 MG capsule TAKE 1 CAPSULE BY MOUTH DAILY. 90 capsule 1 1/9/2017 at Unknown time   • Potassium Gluconate 550 MG tablet Take 1 tablet by mouth daily.   1/9/2017 at Unknown time   • Acetaminophen (TYLENOL EXTRA STRENGTH PO) Take  by mouth.   Unknown at Unknown time   • acetaminophen-codeine (TYLENOL #3) 300-30 MG per tablet Take 1 tablet by mouth every 4 (four) hours as needed for mild pain (1-3) or moderate pain (4-6). 40 tablet 2 Taking   • amLODIPine (NORVASC) 5 MG tablet Take 5 mg by mouth daily.      • Ascorbic Acid (VITAMIN C) 500 MG capsule daily.   Not Taking   • calcium carbonate (MAALOX) 600 MG chewable tablet    Taking   • ciprofloxacin (CIPRO) 250 MG tablet Take 1 tablet by mouth 2 (Two) Times a Day. 10 tablet 0    • HEMORRHOIDAL 3 % ointment daily as needed.   Taking   • HYDROcodone-acetaminophen (NORCO) 5-325 MG per tablet Take 1 tablet by mouth every 6 (six) hours as needed for moderate pain (4-6). 20 tablet 0 Unknown at Unknown time   • lisinopril (PRINIVIL,ZESTRIL) 40 MG tablet Take 40 mg by mouth daily.      • loratadine (CLARITIN) 10 MG tablet Take 10 mg by mouth daily.   Unknown at Unknown time   • melatonin 5 MG tablet tablet daily.   Taking   • Menthol, Topical Analgesic, 2 % gel    Taking   • metoprolol tartrate (LOPRESSOR) 100 MG tablet Take 100 mg by mouth 2 (two) times a day.      • muscle rub (Awais-Fleming) 10-15 % cream cream daily.   Taking   • naproxen sodium (ALEVE) 220 MG tablet daily.   Taking   • Neomycin-Bacitracin-Polymyxin (HCA TRIPLE ANTIBIOTIC OINTMENT EX)    Taking   • Vitamin D, Cholecalciferol, 1000 UNITS capsule daily.   Taking     Allergies:  Alendronate; Atorvastatin; Colesevelam hcl; Ezetimibe; Hydrochlorothiazide; Nitrofurantoin; and Statins    Review of Systems   Constitutional: Negative for activity change, appetite change, fatigue and fever.   HENT: Negative for mouth sores, sore throat and trouble swallowing.    Eyes: Negative for  pain and visual disturbance.   Respiratory: Negative for cough, chest tightness and shortness of breath.    Cardiovascular: Negative for chest pain, palpitations and leg swelling.   Gastrointestinal: Positive for anal bleeding. Negative for abdominal pain, constipation, diarrhea, nausea, rectal pain and vomiting.   Endocrine:        Negative for Diabetes or thyroid disease   Genitourinary: Negative for difficulty urinating, dysuria and hematuria.   Musculoskeletal: Positive for arthralgias. Negative for back pain and neck pain.        Chronic knees   Skin: Negative for rash and wound.   Neurological: Negative for dizziness, syncope, weakness, light-headedness and headaches.   Hematological: Negative for adenopathy. Bruises/bleeds easily.   Psychiatric/Behavioral: Negative for agitation, behavioral problems and confusion.        Objective    Vital Signs  Temp:  [97 °F (36.1 °C)-97.4 °F (36.3 °C)] 97.4 °F (36.3 °C)  Heart Rate:  [59-66] 60  Resp:  [14-16] 16  BP: (110-156)/(63-89) 140/68  SpO2:  [97 %-99 %] 97 %  on   ;   O2 Device: room air  Body mass index is 29.02 kg/(m^2).    Physical Exam   Constitutional: She appears well-developed and well-nourished.   Elderly, frail appearing but in no acute distress   HENT:   Head: Normocephalic and atraumatic.   Mouth/Throat: Oropharynx is clear and moist. No oropharyngeal exudate.   Eyes: Conjunctivae and EOM are normal. Pupils are equal, round, and reactive to light. No scleral icterus.   Neck: Normal range of motion. Neck supple. No JVD present. No thyromegaly present.   Cardiovascular: Normal rate, regular rhythm and normal heart sounds.  Exam reveals no gallop and no friction rub.    No murmur heard.  Pulmonary/Chest: Effort normal and breath sounds normal. No stridor. No respiratory distress.   Abdominal: Soft. Bowel sounds are normal. She exhibits no distension. There is no tenderness. There is no rebound and no guarding.   Musculoskeletal: She exhibits tenderness.  She exhibits no edema.   Tenderness on palpation of bilateral knees from chronic osteoarthritis   Lymphadenopathy:     She has no cervical adenopathy.   Neurological: She is alert. No cranial nerve deficit.   Skin: Skin is warm and dry. No rash noted. No erythema. No pallor.   Psychiatric: She has a normal mood and affect. Her behavior is normal.       Results Review:   I reviewed the patient's new clinical results.  Imaging Results (last 24 hours)     ** No results found for the last 24 hours. **          Assessment/Plan     Principal Problem:    Rectal bleeding  Active Problems:    Diverticulosis of intestine    Chronic a-fib    HTN (hypertension)    Hypokalemia    ALLY (acute kidney injury)      Assessment & Plan    She has been admitted to our service for rectal bleeding.  She has history of this in June 2016 and has a history of diverticulosis so I believe this is most likely cause.  She is hemodynamically stable and her hemoglobin is actually increased from previous 4 months ago.  We'll continue to trend her hemoglobins every 8 hours and transfuse if necessary.  I will hold her aspirin for now.  She was discontinued from her Coumadin therapy after her previous bleeding.  Also consult Dr. Ruffin since he has evaluated patient on last admission.  Replace her potassium.  For acute kidney injury and acidosis, continue IV fluids and recheck in the morning.  We'll hold her Lasix. Will cont metoprolol given the bleeding has stopped and she is hemodynamically stable with good BP.  SCDs for DVT prophylaxis    I discussed the CODE STATUS with the patient and she is a DO NOT RESUSCITATE.  Her healthcare surrogate is her oldest son.    I discussed the patients findings and my recommendations with patient.          Curry Pereira MD  [facility]  01/10/17  12:36 PM

## 2017-01-11 VITALS
WEIGHT: 148.6 LBS | TEMPERATURE: 98 F | BODY MASS INDEX: 29.17 KG/M2 | HEART RATE: 60 BPM | HEIGHT: 60 IN | OXYGEN SATURATION: 98 % | DIASTOLIC BLOOD PRESSURE: 71 MMHG | SYSTOLIC BLOOD PRESSURE: 135 MMHG | RESPIRATION RATE: 18 BRPM

## 2017-01-11 PROBLEM — N17.9 AKI (ACUTE KIDNEY INJURY) (HCC): Status: RESOLVED | Noted: 2017-01-10 | Resolved: 2017-01-11

## 2017-01-11 PROBLEM — E87.20 ACIDOSIS: Status: RESOLVED | Noted: 2017-01-10 | Resolved: 2017-01-11

## 2017-01-11 PROBLEM — E87.6 HYPOKALEMIA: Status: RESOLVED | Noted: 2017-01-10 | Resolved: 2017-01-11

## 2017-01-11 LAB
ANION GAP SERPL CALCULATED.3IONS-SCNC: 9.4 MMOL/L
BASOPHILS # BLD AUTO: 0.01 10*3/MM3 (ref 0–0.2)
BASOPHILS NFR BLD AUTO: 0.1 % (ref 0–1.5)
BUN BLD-MCNC: 9 MG/DL (ref 8–23)
BUN/CREAT SERPL: 11 (ref 7–25)
CALCIUM SPEC-SCNC: 9.2 MG/DL (ref 8.6–10.5)
CHLORIDE SERPL-SCNC: 102 MMOL/L (ref 98–107)
CO2 SERPL-SCNC: 26.6 MMOL/L (ref 22–29)
CREAT BLD-MCNC: 0.82 MG/DL (ref 0.57–1)
DEPRECATED RDW RBC AUTO: 48 FL (ref 37–54)
EOSINOPHIL # BLD AUTO: 0.11 10*3/MM3 (ref 0–0.7)
EOSINOPHIL NFR BLD AUTO: 1.5 % (ref 0.3–6.2)
ERYTHROCYTE [DISTWIDTH] IN BLOOD BY AUTOMATED COUNT: 14.3 % (ref 11.7–13)
GFR SERPL CREATININE-BSD FRML MDRD: 66 ML/MIN/1.73
GLUCOSE BLD-MCNC: 85 MG/DL (ref 65–99)
HCT VFR BLD AUTO: 36.3 % (ref 35.6–45.5)
HGB BLD-MCNC: 11.4 G/DL (ref 11.9–15.5)
HGB BLD-MCNC: 12 G/DL (ref 11.9–15.5)
HGB BLD-MCNC: 12 G/DL (ref 11.9–15.5)
IMM GRANULOCYTES # BLD: 0.02 10*3/MM3 (ref 0–0.03)
IMM GRANULOCYTES NFR BLD: 0.3 % (ref 0–0.5)
LYMPHOCYTES # BLD AUTO: 1.73 10*3/MM3 (ref 0.9–4.8)
LYMPHOCYTES NFR BLD AUTO: 23.8 % (ref 19.6–45.3)
MCH RBC QN AUTO: 30.7 PG (ref 26.9–32)
MCHC RBC AUTO-ENTMCNC: 33.1 G/DL (ref 32.4–36.3)
MCV RBC AUTO: 92.8 FL (ref 80.5–98.2)
MONOCYTES # BLD AUTO: 0.98 10*3/MM3 (ref 0.2–1.2)
MONOCYTES NFR BLD AUTO: 13.5 % (ref 5–12)
NEUTROPHILS # BLD AUTO: 4.41 10*3/MM3 (ref 1.9–8.1)
NEUTROPHILS NFR BLD AUTO: 60.8 % (ref 42.7–76)
PLATELET # BLD AUTO: 138 10*3/MM3 (ref 140–500)
PMV BLD AUTO: 13.1 FL (ref 6–12)
POTASSIUM BLD-SCNC: 4.1 MMOL/L (ref 3.5–5.2)
RBC # BLD AUTO: 3.91 10*6/MM3 (ref 3.9–5.2)
SODIUM BLD-SCNC: 138 MMOL/L (ref 136–145)
WBC NRBC COR # BLD: 7.26 10*3/MM3 (ref 4.5–10.7)

## 2017-01-11 PROCEDURE — 80048 BASIC METABOLIC PNL TOTAL CA: CPT | Performed by: INTERNAL MEDICINE

## 2017-01-11 PROCEDURE — 85018 HEMOGLOBIN: CPT | Performed by: INTERNAL MEDICINE

## 2017-01-11 PROCEDURE — 25810000003 SODIUM CHLORIDE 0.9 % WITH KCL 20 MEQ 20-0.9 MEQ/L-% SOLUTION: Performed by: INTERNAL MEDICINE

## 2017-01-11 PROCEDURE — 96361 HYDRATE IV INFUSION ADD-ON: CPT

## 2017-01-11 PROCEDURE — 85014 HEMATOCRIT: CPT | Performed by: INTERNAL MEDICINE

## 2017-01-11 PROCEDURE — 99224 PR SBSQ OBSERVATION CARE/DAY 15 MINUTES: CPT | Performed by: INTERNAL MEDICINE

## 2017-01-11 PROCEDURE — 85025 COMPLETE CBC W/AUTO DIFF WBC: CPT | Performed by: INTERNAL MEDICINE

## 2017-01-11 PROCEDURE — G0378 HOSPITAL OBSERVATION PER HR: HCPCS

## 2017-01-11 RX ADMIN — POTASSIUM CHLORIDE AND SODIUM CHLORIDE 75 ML/HR: 900; 150 INJECTION, SOLUTION INTRAVENOUS at 05:33

## 2017-01-11 RX ADMIN — PANTOPRAZOLE SODIUM 40 MG: 40 TABLET, DELAYED RELEASE ORAL at 05:34

## 2017-01-11 RX ADMIN — METOPROLOL TARTRATE 25 MG: 25 TABLET ORAL at 08:34

## 2017-01-11 RX ADMIN — CETIRIZINE HYDROCHLORIDE 5 MG: 10 TABLET, FILM COATED ORAL at 08:33

## 2017-01-11 RX ADMIN — BUSPIRONE HYDROCHLORIDE 15 MG: 15 TABLET ORAL at 08:34

## 2017-01-11 NOTE — DISCHARGE SUMMARY
DATE OF ADMISSION: 01/10/2017     DATE OF DISCHARGE:   01/11/2017    CONSULTATIONS:  Phill Ruffin MD, Gastroenterology    DISCHARGE DIAGNOSES:  1.  Recurrent GI bleed presumed diverticular in etiology.   2.  Chronic atrial fibrillation, see hospital course.   3.  Hypertension.   4.  Hypokalemia, corrected.   5.  Acute renal failure, corrected.     HOSPITAL COURSE: The patient is a pleasant 89-year-old female initially admitted by my colleague, Dr. Pereira for recurrent GI bleed. I have taken care of this patient previously and discharged her back in of June 2016 for similar episode when she had a lower GI bleed in addition to a past history of diverticulosis and AVMs. GI was consulted. The patient's blood counts have remained stable during this hospitalization. She did trend down to 11.4, though her hemoglobin has come back up to 12.0 by the time of disposition, she has had no further complaints of lower GI bleed. The patient was formerly kept on Coumadin, though this was discontinued in the outpatient setting per Dr. Guy Andrade who is her cardiologist. She was placed on low dose baby aspirin at that time. I actually called to discuss the case with him over the phone to make sure he was okay from a cardiac standpoint in perhaps giving the patient holiday from even aspirin at this juncture. She does have close followup him within the next 2 weeks and will hold aspirin at the time of disposition. GI has given the patient some literature in regards to increase fiber in diet as well as dietary change her to try to prevent any further exacerbation. The patient's vital signs are stable, as well as afebrile. She clinically looks fine and is at baseline and denies any complaints of abdominal pain or further GI bleeding or any cardiopulmonary complaints as well. I do feel the patient is medically stable for disposition at this juncture.     DISPOSITION: To home.     FOLLOWUP:   1.  The patient can see her PCP in  the next couple of weeks.   2.  Followup with cardiology is already scheduled within 2 weeks.   3.  Follow up with gastroenterology per their recommendation.     DISCHARGE MEDICATIONS:  1.  Tylenol p.r.n.   2.  BuSpar 15 mg b.i.d.   3.  Claritin 10 mg daily.   4.  Metoprolol tartrate 25 mg b.i.d.   5.  Lasix 40 mg daily.   6.  Potassium daily.   7.  Multivitamin daily.   8.  Prilosec 40 mg daily.   9.  Vitamin C daily.   10.  Vitamin D 1000 units daily.     DISCONTINUED MEDICATIONS:  The patient can continue all home medications except aspirin.      Less than 30 minutes was spent organizing discharge.         Landry Titus MD  LOPES:sierra  D:   01/11/2017 10:48:53  T:   01/11/2017 14:53:41  Job ID:   [id number]  Document ID:   [id number]  cc:

## 2017-01-11 NOTE — CONSULTS
Adult Nutrition  Assessment/PES    Patient Name:  Cecelia Enriquez  YOB: 1927  MRN: [id number]  Admit Date:  1/10/2017    Assessment Date:  1/11/2017     Consult complete-went over information regarding diverticulosis and how to manage through nutrition-gave handouts for reference; RD to monitor and follow         Reason for Assessment       01/11/17 1112    Reason for Assessment    Reason For Assessment/Visit education    Diagnosis --   diverticulosis; acute lower GI bleed                Anthropometrics       01/11/17 1113    Anthropometrics (Special Considerations)    Height Used for Calculations 1.524 m (5')    Weight Used for Calculations 67.1 kg (148 lb)    RD Calculated     RD Calculated %     RD Calculated BMI (kg/m2) 29    Body Mass Index (BMI)    BMI Grade 25 - 29.9 - overweight            Labs/Tests/Procedures/Meds       01/11/17 1113    Labs/Tests/Procedures/Meds    Diagnostic Test/Procedure Review reviewed    Labs/Tests Review Reviewed;Platlets    Medication Review Reviewed, pertinent   PPI, NaCl    Significant Vitals reviewed            Physical Findings       01/11/17 1114    Physical Findings/Assessment    Additional Documentation --   B=18              Nutrition Prescription Ordered       01/11/17 1114    Nutrition Prescription PO    Common Modifiers GI Soft/[place]                Problem/Interventions:        Problem 1       01/11/17 1114    Nutrition Diagnoses Problem 1    Problem 1 No Nutrition Diagnosis at this Time                    Intervention Goal       01/11/17 1114    Intervention Goal    General Maintain nutrition    PO Tolerate PO;Increase intake    Weight Maintain weight            Nutrition Intervention       01/11/17 1114    Nutrition Intervention    RD/Tech Action Interview for preference;Follow Tx progress;Care plan reviewd;Encourage intake              Education/Evaluation       01/11/17 1115    Education    Education Provided education regarding    diverticulosis nutrition therapy    Monitor/Evaluation    Monitor Per protocol    Education Follow-up Reinforce PRN          Electronically signed by:  Malinda Dinh RD  01/11/17 11:15 AM

## 2017-01-11 NOTE — PLAN OF CARE
Problem: Patient Care Overview (Adult)  Goal: Plan of Care Review  Outcome: Outcome(s) achieved Date Met:  01/11/17 01/11/17 1000   Outcome Evaluation   Outcome Summary/Follow up Plan Goals met. Discharge home.       Goal: Adult Individualization and Mutuality  Outcome: Outcome(s) achieved Date Met:  01/11/17  Goal: Discharge Needs Assessment  Outcome: Outcome(s) achieved Date Met:  01/11/17    Problem: Gastrointestinal Bleeding (Adult)  Goal: Signs and Symptoms of Listed Potential Problems Will be Absent or Manageable (Gastrointestinal Bleeding)  Outcome: Outcome(s) achieved Date Met:  01/11/17    Problem: Fall Risk (Adult)  Goal: Absence of Falls  Outcome: Outcome(s) achieved Date Met:  01/11/17    Problem: Mobility, Physical Impaired (Adult)  Goal: Enhanced Mobility Skills  Outcome: Outcome(s) achieved Date Met:  01/11/17  Goal: Enhanced Functionality Ability  Outcome: Outcome(s) achieved Date Met:  01/11/17    Problem: Fluid Volume Deficit (Adult)  Goal: Fluid/Electrolyte Balance  Outcome: Outcome(s) achieved Date Met:  01/11/17  Goal: Comfort/Well Being  Outcome: Outcome(s) achieved Date Met:  01/11/17

## 2017-01-11 NOTE — PLAN OF CARE
Problem: Patient Care Overview (Adult)  Goal: Plan of Care Review  Outcome: Ongoing (interventions implemented as appropriate)    01/11/17 0207   Coping/Psychosocial Response Interventions   Plan Of Care Reviewed With patient   Patient Care Overview   Progress improving   Outcome Evaluation   Outcome Summary/Follow up Plan Safety maintained, no S/S of GI Bleed. H/H stable, no c/o's , up with assist.        Goal: Adult Individualization and Mutuality  Outcome: Ongoing (interventions implemented as appropriate)  Goal: Discharge Needs Assessment  Outcome: Ongoing (interventions implemented as appropriate)    Problem: Gastrointestinal Bleeding (Adult)  Goal: Signs and Symptoms of Listed Potential Problems Will be Absent or Manageable (Gastrointestinal Bleeding)  Outcome: Ongoing (interventions implemented as appropriate)    Problem: Fall Risk (Adult)  Goal: Identify Related Risk Factors and Signs and Symptoms  Outcome: Outcome(s) achieved Date Met:  01/11/17  Goal: Absence of Falls  Outcome: Ongoing (interventions implemented as appropriate)    Problem: Mobility, Physical Impaired (Adult)  Goal: Identify Related Risk Factors and Signs and Symptoms  Outcome: Outcome(s) achieved Date Met:  01/11/17  Goal: Enhanced Mobility Skills  Outcome: Ongoing (interventions implemented as appropriate)  Goal: Enhanced Functionality Ability  Outcome: Ongoing (interventions implemented as appropriate)    Problem: Fluid Volume Deficit (Adult)  Goal: Identify Related Risk Factors and Signs and Symptoms  Outcome: Outcome(s) achieved Date Met:  01/11/17  Goal: Fluid/Electrolyte Balance  Outcome: Ongoing (interventions implemented as appropriate)  Goal: Comfort/Well Being  Outcome: Ongoing (interventions implemented as appropriate)

## 2017-01-11 NOTE — SIGNIFICANT NOTE
Some desaturations while sleeping flat, mouth breather, o2 sats down to 85% , quickly returned to >92%.

## 2017-01-11 NOTE — NURSING NOTE
"Dietician in to see now for diet instruction \"high fiber\" diet per patient request.    Neelima Posada RN  "

## 2017-01-11 NOTE — PROGRESS NOTES
Continued Stay Note  [facility]     Patient Name: Cecelia Enriquez  MRN: [id number]  Today's Date: 1/11/2017    Admit Date: 1/10/2017          Discharge Plan       01/11/17 1455    Final Note    Final Note Home with no needs              Discharge Codes       01/11/17 1455    Discharge Codes    Discharge Codes 01  Discharge to home        Expected Discharge Date and Time     Expected Discharge Date Expected Discharge Time    Jan 11, 2017             Jessica Hendricks RN

## 2017-01-11 NOTE — PROGRESS NOTES
Discharge Planning Assessment  [facility]     Patient Name: Cecelia Enriquez  MRN: [id number]  Today's Date: 1/11/2017    Admit Date: 1/10/2017          Discharge Needs Assessment       01/11/17 1202    Living Environment    Provides Primary Care For no one    Quality Of Family Relationships supportive    Able to Return to Prior Living Arrangements yes    Discharge Needs Assessment    Concerns To Be Addressed no discharge needs identified    Anticipated Changes Related to Illness none    Equipment Currently Used at Home walker, rolling    Equipment Needed After Discharge none    Transportation Available family or friend will provide            Discharge Plan       01/11/17 1133    Case Management/Social Work Plan    Plan Home    Patient/Family In Agreement With Plan unable to assess    Additional Comments Obs acknowledgement signed.  OBS status written on white board.  Spoke with patient at bedside.  I introduced self and explained CCP role.  Verified face sheet. [facility] on [address] [facility] and [facility], confirmed as her pharmacy.  Pt is independent at home. She lives alone in a one story house  with a basement.  She does not use the stairs.   She has no difficulty in moving around in her home.  She uses rolling walker.  She reports history of HH with [facility].  She has had a rehab stay at [facility].  She states she would return there but does not  feel she needs it at this time.  Patients plan is home.  CCP will continue to follow         Discharge Placement     No information found        Expected Discharge Date and Time     Expected Discharge Date Expected Discharge Time    Jan 11, 2017               Demographic Summary     None            Functional Status       01/11/17 1200    Functional Status Current    Ambulation 1-->assistive equipment    Transferring 1-->assistive equipment    Toileting 2-->assistive person    Bathing 2-->assistive person    Dressing 2-->assistive person    Eating 0-->independent     Communication 0-->understands/communicates without difficulty    Change in Functional Status Since Onset of Current Illness/Injury yes    Functional Status Prior    Ambulation 1-->assistive equipment    Transferring 1-->assistive equipment    Toileting 0-->independent    Bathing 0-->independent    Dressing 0-->independent    Eating 0-->independent    Communication 0-->understands/communicates without difficulty    Activity Tolerance    Current Activity Limitations none    Cognitive/Perceptual/Developmental    Current Mental Status/Cognitive Functioning no deficits noted    Recent Changes in Mental Status/Cognitive Functioning unable to assess            Psychosocial     None            Abuse/Neglect     None            Legal     None            Substance Abuse     None            Patient Forms     None          Jessica Hendricks, BEENA

## 2017-01-11 NOTE — PROGRESS NOTES
Discharge Planning Assessment  [facility]     Patient Name: Cecelia Enriquez  MRN: [id number]  Today's Date: 1/11/2017    Admit Date: 1/10/2017          Discharge Needs Assessment     None            Discharge Plan       01/11/17 1133    Case Management/Social Work Plan    Plan Home    Patient/Family In Agreement With Plan unable to assess    Additional Comments Obs acknowledgement signed.  OBS status written on white board.  Spoke with patient at bedside.  I introduced self and explained CCP role.  Verified face sheet. [facility] on [address] [facility] and [facility], confirmed as her pharmacy.  Pt is independent at home. She lives alone in a one story house  with a basement.  She does not use the stairs.   She has no difficulty in moving around in her home.  She uses rolling walker.  She reports history of HH with [facility].  She has had a rehab stay at [facility].  She states she would return there but does not  feel she needs it at this time.  Patients plan is home.  CCP will continue to follow         Discharge Placement     No information found        Expected Discharge Date and Time     Expected Discharge Date Expected Discharge Time    Jan 11, 2017               Demographic Summary     None            Functional Status     None            Psychosocial     None            Abuse/Neglect     None            Legal     None            Substance Abuse     None            Patient Forms     None          Jessica Hendricks RN

## 2017-01-11 NOTE — PROGRESS NOTES
[facility]/GI Progress Note   Chief Complaint:  Rectal bleed    Subjective     Interval History:   No overt bleeding. No BM since admission. No GI complaints.     History taken from: patient chart RN    Review of Systems:    per HPI    Objective     Vital Signs  Temp:  [97.9 °F (36.6 °C)-98.2 °F (36.8 °C)] 98 °F (36.7 °C)  Heart Rate:  [59-60] 60  Resp:  [16-20] 18  BP: (117-140)/(66-71) 135/71  Body mass index is 29.02 kg/(m^2).    Intake/Output Summary (Last 24 hours) at 01/11/17 0964  Last data filed at 01/11/17 0533   Gross per 24 hour   Intake    999 ml   Output    700 ml   Net    299 ml          Physical Exam:   General: patient awake, alert and cooperative   Eyes: Normal lids and lashes, no scleral icterus, no conjunctival pallor   Neck: supple, normal ROM, no tracheal deviation, no thyromegaly   Skin: warm and dry, not jaundiced   Cardiovascular: regular rhythm and rate, no murmurs auscultated   Pulm: clear to auscultation bilaterally, regular and unlabored   Abdomen: soft, nontender, nondistended; normal bowel sounds   Rectal: deferred   Extremities: no rash or edema   Neurologic: Normal mood and behavior    All Medications Have Been Reviewed     Results Review:       Results from last 7 days  Lab Units 01/11/17  0605 01/10/17  2356 01/10/17  1616 01/10/17  0742   WBC 10*3/mm3 7.26  --   --  7.51   HEMOGLOBIN g/dL 12.0  12.0 11.4* 12.3 13.8   HEMATOCRIT % 36.3  36.3 36.3 37.0 40.8   PLATELETS 10*3/mm3 138*  --   --  186         Results from last 7 days  Lab Units 01/11/17  0605 01/10/17  0742   SODIUM mmol/L 138 132*   POTASSIUM mmol/L 4.1 3.3*   CHLORIDE mmol/L 102 86*   TOTAL CO2 mmol/L 26.6 27.3   BUN mg/dL 9 16   CREATININE mg/dL 0.82 1.19*   CALCIUM mg/dL 9.2 10.2   BILIRUBIN mg/dL  --  1.1   ALK PHOS U/L  --  102   ALT (SGPT) U/L  --  7   AST (SGOT) U/L  --  20   GLUCOSE mg/dL 85 196*             RADIOLOGY:    Imaging Results (last 72 hours)     ** No results found for the last 72 hours. 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   • Arthritis of knee M19.90   • Cramp of limb R25.2   • Congestive heart failure I50.9   • Depression F32.9   • Diverticulosis of intestine K57.90   • Dysuria R30.0   • Gastroesophageal reflux disease K21.9   • Fever R50.9   • Hyperglycemia R73.9   • Hyperlipidemia E78.5   • Impacted cerumen H61.20   • Knee pain M25.569   • Mitral valve insufficiency I34.0   • Osteoporosis M81.0   • Pacemaker Z95.0   • Urinary incontinence R32   • Atrial fibrillation I48.91   • Chronic a-fib I48.2   • HTN (hypertension) I10   • Gait instability R26.81   • Hypokalemia E87.6   • Rectal bleeding K62.5   • ALLY (acute kidney injury) N17.9   • Acidosis E87.2       Iesha Mariano, APRN  01/11/17  9:49 AM  Following for rectal bleeding    NO Bleeding overnight      Constitutional: She is oriented to person, place, and time. She appears well-developed and well-nourished.   HENT: anicteric, no thyromegaly  Head: Normocephalic and atraumatic.   Eyes: Conjunctivae and EOM are normal.   Neck: Normal range of motion. No tracheal deviation present. Cardiovascular: Normal rate and regular rhythm.    Pulmonary/Chest: Effort normal and breath sounds normal. No respiratory distress.   Abdominal: Soft. Bowel sounds are normal. She exhibits no distension and no mass. There is no tenderness. There is no rebound and no guarding.   Musculoskeletal: Normal range of motion.   Neurological: She is alert and oriented to person, place, and time.   Skin: Skin is warm and dry.   Psychiatric: She has a normal mood and affect. Judgment normal.   Nursing note and vitals reviewed.    I am following for rectal bleeding.         A/P  1. Acute lower GI bleeding- likely diverticular, possibly hemorrhoidal, normal hemoglobin. No overt bleeding  2. diverticulosis  3. history of GI bleeding     - Hemodynamically stable with hemoglobin within normal  limits  -advance diet. Dietician to see   - Defer colonoscopy at this time and proceed with conservative   - Ok for discharge from GI standpoint . Follow up with [facility] on 2/13/17 at 11am

## 2017-01-13 ENCOUNTER — OFFICE VISIT (OUTPATIENT)
Dept: FAMILY MEDICINE CLINIC | Facility: CLINIC | Age: 82
End: 2017-01-13

## 2017-01-13 VITALS
TEMPERATURE: 98.4 F | SYSTOLIC BLOOD PRESSURE: 136 MMHG | WEIGHT: 127 LBS | HEIGHT: 60 IN | DIASTOLIC BLOOD PRESSURE: 70 MMHG | BODY MASS INDEX: 24.94 KG/M2 | OXYGEN SATURATION: 94 % | HEART RATE: 83 BPM | RESPIRATION RATE: 17 BRPM

## 2017-01-13 DIAGNOSIS — I50.9 CONGESTIVE HEART FAILURE, UNSPECIFIED CONGESTIVE HEART FAILURE CHRONICITY, UNSPECIFIED CONGESTIVE HEART FAILURE TYPE: ICD-10-CM

## 2017-01-13 DIAGNOSIS — K62.5 RECTAL BLEEDING: ICD-10-CM

## 2017-01-13 DIAGNOSIS — I10 ESSENTIAL HYPERTENSION: Primary | ICD-10-CM

## 2017-01-13 DIAGNOSIS — N39.0 RECURRENT UTI: ICD-10-CM

## 2017-01-13 PROCEDURE — 99213 OFFICE O/P EST LOW 20 MIN: CPT | Performed by: FAMILY MEDICINE

## 2017-01-13 RX ORDER — POTASSIUM CHLORIDE 20 MEQ/1
20 TABLET, EXTENDED RELEASE ORAL DAILY PRN
Qty: 30 TABLET | Refills: 5 | Status: SHIPPED | OUTPATIENT
Start: 2017-01-13 | End: 2017-07-14 | Stop reason: SDUPTHER

## 2017-01-13 RX ORDER — FUROSEMIDE 40 MG/1
40 TABLET ORAL DAILY PRN
Qty: 90 TABLET | Refills: 3
Start: 2017-01-13 | End: 2018-04-21

## 2017-01-13 NOTE — PROGRESS NOTES
EDGAR Enriquez is a 89 y.o. female who is here for follow up after recent hospitalization for lower GI bleed.  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  Has known atrial fibrillation but obviously cannot take anticoagulation therapy with frequent bleeds.  Has noted small amount of blood in her stool since leaving the hospital and is concerned about some black bowel movements?      Review of Systems   Respiratory: Negative for shortness of breath.    Cardiovascular: Negative for leg swelling.   Gastrointestinal: Positive for blood in stool.         Past Medical History   Diagnosis Date   • Allergic rhinitis    • Anemia in CKD (chronic kidney disease)    • Anxiety    • Arteriovenous malformation    • Atrial fibrillation    • CHF (congestive heart failure)    • Depression    • Diverticulosis    • Dysuria    • Fever    • GERD (gastroesophageal reflux disease)    • High risk medication use    • Hyperglycemia    • Hyperlipidemia    • Hypertension    • Impacted cerumen of right ear    • Iron deficiency anemia    • Knee pain    • Lower GI bleed    • Mitral regurgitation    • Osteoporosis    • RBBB (right bundle branch block)    • Rheumatoid arthritis    • Thrombocytopenia    • Urinary incontinence        Past Surgical History   Procedure Laterality Date   • Pacemaker implantation     • Colonoscopy       12- DR HORTA RECOMMENDED/DONE AT [facility]       Family History   Problem Relation Age of Onset   • Cancer Mother    • Cancer Father    • Diabetes Daughter    • Cancer Other    • Diabetes Other    • Hypertension Other    • Heart attack Other        Social History     Social History   • Marital status:      Spouse name: N/A   • Number of children: N/A   • Years of education: N/A     Occupational History   • Not on file.     Social History Main Topics   • Smoking status: Former Smoker     Years: 5.00   • Smokeless  tobacco: Never Used      Comment: CAFFEINE USE   • Alcohol use No   • Drug use: No   • Sexual activity: Defer     Other Topics Concern   • Not on file     Social History Narrative         Physical Exam   Constitutional: She is oriented to person, place, and time. She appears well-developed and well-nourished. No distress.   HENT:   Head: Normocephalic.   Mouth/Throat: Oropharynx is clear and moist.   Eyes: Conjunctivae and EOM are normal. Pupils are equal, round, and reactive to light.   Neck: Normal range of motion. No JVD present.   Pulmonary/Chest: Effort normal. No respiratory distress.   Abdominal: Soft. She exhibits no distension. There is no tenderness.   Musculoskeletal: She exhibits no edema.   Ambulates with her walker   Neurological: She is alert and oriented to person, place, and time.   Skin: Skin is warm and dry. No pallor.   Psychiatric: She has a normal mood and affect. Her behavior is normal. Judgment and thought content normal.   Vitals reviewed.        Assessment/Plan    Cecelia was seen today for rectal bleeding.    Diagnoses and all orders for this visit:    Essential hypertension    Congestive heart failure, unspecified congestive heart failure chronicity, unspecified congestive heart failure type  -     Basic Metabolic Panel    Rectal bleeding  -     CBC & Differential    Recurrent UTI  -     Urinalysis With / Microscopic If Indicated  -     Urine Culture    Other orders  -     furosemide (LASIX) 40 MG tablet; Take 1 tablet by mouth Daily As Needed (swelling).  -     potassium chloride (K-DUR,KLOR-CON) 20 MEQ CR tablet; Take 1 tablet by mouth Daily As Needed (when takes diuretic).     patient here for follow-up of recent recurrent lower GI bleed.  Reported history of diverticulosis and also AV malformations has possible etiologies of her bleeds.  Will recheck blood count.  Patient has remained off Lasix since being admitted to the hospital.  Discussed resuming on an as-needed basis only for  swelling.  Also take potassium supplement whenever Lasix is taken as discussed.  Will recheck electrolytes and follow-up closely with an appointment in one month or as needed.    This note includes information entered using a voice recognition dictation system.  Though reviewed, some nonsensible errors may remain.

## 2017-01-15 LAB
APPEARANCE UR: CLEAR
BACTERIA #/AREA URNS HPF: NORMAL /[HPF]
BACTERIA UR CULT: ABNORMAL
BACTERIA UR CULT: ABNORMAL
BASOPHILS # BLD AUTO: 0 X10E3/UL (ref 0–0.2)
BASOPHILS NFR BLD AUTO: 0 %
BILIRUB UR QL STRIP: NEGATIVE
BUN SERPL-MCNC: 10 MG/DL (ref 8–27)
BUN/CREAT SERPL: 11 (ref 11–26)
CALCIUM SERPL-MCNC: 10 MG/DL (ref 8.7–10.3)
CHLORIDE SERPL-SCNC: 92 MMOL/L (ref 96–106)
CO2 SERPL-SCNC: 29 MMOL/L (ref 18–29)
COLOR UR: YELLOW
CREAT SERPL-MCNC: 0.95 MG/DL (ref 0.57–1)
EOSINOPHIL # BLD AUTO: 0.1 X10E3/UL (ref 0–0.4)
EOSINOPHIL NFR BLD AUTO: 1 %
EPI CELLS #/AREA URNS HPF: NORMAL /HPF
ERYTHROCYTE [DISTWIDTH] IN BLOOD BY AUTOMATED COUNT: 14.5 % (ref 12.3–15.4)
GLUCOSE SERPL-MCNC: 108 MG/DL (ref 65–99)
GLUCOSE UR QL: NEGATIVE
HCT VFR BLD AUTO: 36.7 % (ref 34–46.6)
HGB BLD-MCNC: 12.4 G/DL (ref 11.1–15.9)
HGB UR QL STRIP: ABNORMAL
IMM GRANULOCYTES # BLD: 0 X10E3/UL (ref 0–0.1)
IMM GRANULOCYTES NFR BLD: 0 %
KETONES UR QL STRIP: NEGATIVE
LEUKOCYTE ESTERASE UR QL STRIP: NEGATIVE
LYMPHOCYTES # BLD AUTO: 1.6 X10E3/UL (ref 0.7–3.1)
LYMPHOCYTES NFR BLD AUTO: 18 %
MCH RBC QN AUTO: 29.7 PG (ref 26.6–33)
MCHC RBC AUTO-ENTMCNC: 33.8 G/DL (ref 31.5–35.7)
MCV RBC AUTO: 88 FL (ref 79–97)
MICRO URNS: ABNORMAL
MONOCYTES # BLD AUTO: 1.1 X10E3/UL (ref 0.1–0.9)
MONOCYTES NFR BLD AUTO: 11 %
MUCOUS THREADS URNS QL MICRO: PRESENT
NEUTROPHILS # BLD AUTO: 6.4 X10E3/UL (ref 1.4–7)
NEUTROPHILS NFR BLD AUTO: 70 %
NITRITE UR QL STRIP: NEGATIVE
OTHER ANTIBIOTIC SUSC ISLT: ABNORMAL
PH UR STRIP: 6 [PH] (ref 5–7.5)
PLATELET # BLD AUTO: 178 X10E3/UL (ref 150–379)
POTASSIUM SERPL-SCNC: 4.1 MMOL/L (ref 3.5–5.2)
PROT UR QL STRIP: NEGATIVE
RBC # BLD AUTO: 4.17 X10E6/UL (ref 3.77–5.28)
RBC #/AREA URNS HPF: NORMAL /HPF
SODIUM SERPL-SCNC: 137 MMOL/L (ref 134–144)
SP GR UR: 1.01 (ref 1–1.03)
UROBILINOGEN UR STRIP-MCNC: 1 MG/DL (ref 0.2–1)
WBC # BLD AUTO: 9.2 X10E3/UL (ref 3.4–10.8)
WBC #/AREA URNS HPF: NORMAL /HPF

## 2017-01-19 ENCOUNTER — OFFICE VISIT (OUTPATIENT)
Dept: CARDIOLOGY | Facility: CLINIC | Age: 82
End: 2017-01-19

## 2017-01-19 ENCOUNTER — CLINICAL SUPPORT NO REQUIREMENTS (OUTPATIENT)
Dept: CARDIOLOGY | Facility: CLINIC | Age: 82
End: 2017-01-19

## 2017-01-19 VITALS
BODY MASS INDEX: 25.13 KG/M2 | WEIGHT: 128 LBS | HEIGHT: 60 IN | DIASTOLIC BLOOD PRESSURE: 70 MMHG | SYSTOLIC BLOOD PRESSURE: 122 MMHG | HEART RATE: 60 BPM

## 2017-01-19 DIAGNOSIS — I48.20 CHRONIC ATRIAL FIBRILLATION (HCC): Primary | ICD-10-CM

## 2017-01-19 DIAGNOSIS — I44.1 AV BLOCK, 2ND DEGREE: ICD-10-CM

## 2017-01-19 PROCEDURE — 93280 PM DEVICE PROGR EVAL DUAL: CPT | Performed by: INTERNAL MEDICINE

## 2017-01-19 PROCEDURE — 93000 ELECTROCARDIOGRAM COMPLETE: CPT | Performed by: INTERNAL MEDICINE

## 2017-01-19 PROCEDURE — 99214 OFFICE O/P EST MOD 30 MIN: CPT | Performed by: INTERNAL MEDICINE

## 2017-01-19 NOTE — PROGRESS NOTES
Cecelia Enriquez  11/11/1927  Date of Office Visit: 01/19/2017  Encounter Provider: Guy Andrade MD  Place of Service: [facility] CARDIOLOGY      CHIEF COMPLAINT:   Chronic atrial fibrillation  Complete heart block status post dual-chamber pacemaker placement     HISTORY OF PRESENT ILLNESS: Dr. Richardson,  I had the pleasure of seeing your patient back in follow up today. As you well know, she is a very pleasant 89-year-old female with a medical history of chronic atrial fibrillation, complete heart block with [employer] dual chamber pacemaker placement, diverticulitis, mild mitral regurgitation and moderate tricuspid regurgitation, who actually recently was in the hospital with renal failure, hypokalemia, and a small amount of lower GI bleeding.  The aspirin was stopped.  Lasix was stopped.  Lab work on the 13th showed that the renal function had normalized. She presents to me for follow up today.  She feels like overall she is doing pretty well.  She still has been taking the Lasix, however, at half dose.  I have encouraged her as per your note to take this p.r.n. and she now understands.  She denies any tachycardia, palpitations, shortness of air or additional blood loss including melena or hematochezia.    Review of Systems   Constitution: Positive for malaise/fatigue. Negative for fever and weakness.   HENT: Negative for nosebleeds and sore throat.    Eyes: Negative for blurred vision and double vision.   Cardiovascular: Negative for chest pain, claudication, palpitations and syncope.   Respiratory: Negative for cough, shortness of breath and snoring.    Endocrine: Negative for cold intolerance, heat intolerance and polydipsia.   Skin: Negative for itching, poor wound healing and rash.   Musculoskeletal: Negative for joint pain, joint swelling, muscle weakness and myalgias.   Gastrointestinal: Negative for abdominal pain, melena, nausea and vomiting.   Neurological: Negative  for light-headedness, loss of balance, seizures and vertigo.   Psychiatric/Behavioral: Negative for altered mental status and depression.          Past Medical History   Diagnosis Date   • Allergic rhinitis    • Anemia in CKD (chronic kidney disease)    • Anxiety    • Arteriovenous malformation    • Atrial fibrillation    • CHF (congestive heart failure)    • Depression    • Diverticulosis    • Dysuria    • Fever    • GERD (gastroesophageal reflux disease)    • High risk medication use    • Hyperglycemia    • Hyperlipidemia    • Hypertension    • Impacted cerumen of right ear    • Iron deficiency anemia    • Knee pain    • Lower GI bleed    • Mitral regurgitation    • Osteoporosis    • RBBB (right bundle branch block)    • Rheumatoid arthritis    • Thrombocytopenia    • Urinary incontinence        The following portions of the patient's history were reviewed and updated as appropriate: Social history , Family history and Surgical history     Current Outpatient Prescriptions on File Prior to Visit   Medication Sig Dispense Refill   • Acetaminophen (TYLENOL EXTRA STRENGTH PO) Take  by mouth.     • Ascorbic Acid (VITAMIN C) 500 MG capsule Take 1 capsule by mouth daily.     • busPIRone (BUSPAR) 15 MG tablet TAKE 1 TABLET TWICE DAILY 180 tablet 5   • Cholecalciferol (VITAMIN D-3) 1000 UNITS capsule Take 1 capsule by mouth daily.     • furosemide (LASIX) 40 MG tablet Take 1 tablet by mouth Daily As Needed (swelling). 90 tablet 3   • loratadine (CLARITIN) 10 MG tablet Take 10 mg by mouth daily.     • metoprolol tartrate (LOPRESSOR) 50 MG tablet Take 1 tablet by mouth 2 (two) times a day. (Patient taking differently: Take 25 mg by mouth 2 (two) times a day.) 180 tablet 3   • Multiple Vitamins-Minerals (CENTRUM SILVER) tablet Take 1 tablet by mouth daily.     • omeprazole (PriLOSEC) 40 MG capsule TAKE 1 CAPSULE BY MOUTH DAILY. 90 capsule 1   • potassium chloride (K-DUR,KLOR-CON) 20 MEQ CR tablet Take 1 tablet by mouth Daily  "As Needed (when takes diuretic). 30 tablet 5     No current facility-administered medications on file prior to visit.        Allergies   Allergen Reactions   • Alendronate    • Atorvastatin    • Colesevelam Hcl    • Ezetimibe    • Hydrochlorothiazide    • Nitrofurantoin    • Statins        Vitals:    01/19/17 1423   BP: 122/70   Pulse: 60   Weight: 128 lb (58.1 kg)   Height: 60\" (152.4 cm)     Physical Exam   Constitutional: She is oriented to person, place, and time. She appears well-developed and well-nourished.   HENT:   Head: Normocephalic and atraumatic.   Eyes: Conjunctivae and EOM are normal. No scleral icterus.   Neck: Normal range of motion. Neck supple. Normal carotid pulses, no hepatojugular reflux and no JVD present. Carotid bruit is not present. No tracheal deviation present. No thyromegaly present.   Cardiovascular: Normal rate and regular rhythm.  Exam reveals no gallop and no friction rub.    No murmur heard.  Pulses:       Carotid pulses are 2+ on the right side, and 2+ on the left side.       Radial pulses are 2+ on the right side, and 2+ on the left side.        Femoral pulses are 2+ on the right side, and 2+ on the left side.       Dorsalis pedis pulses are 2+ on the right side, and 2+ on the left side.        Posterior tibial pulses are 2+ on the right side, and 2+ on the left side.   Pulmonary/Chest: Breath sounds normal. No respiratory distress. She has no decreased breath sounds. She has no wheezes. She has no rhonchi. She has no rales. She exhibits no tenderness.   Abdominal: Soft. Bowel sounds are normal. She exhibits no distension. There is no tenderness. There is no rebound.   Musculoskeletal: She exhibits no edema or deformity.   Neurological: She is alert and oriented to person, place, and time. She has normal strength. No sensory deficit.   Skin: No rash noted. No erythema.   Psychiatric: She has a normal mood and affect. Her behavior is normal.     No components found for: CBC  No " results found for: CMP  No components found for: LIPID  No results found for: BMP      ECG 12 Lead  Date/Time: 1/19/2017 3:05 PM  Performed by: YOSSI SANDOVAL  Authorized by: YOSSI SANDOVAL   Comparison: compared with previous ECG from 6/30/2016  Rhythm: atrial fibrillation and paced  Rate: normal  Clinical impression: abnormal ECG               DISCUSSION/SUMMARY This is an 89-year-old female with a medical history of atrial fibrillation that is chronic, dual chamber pacemaker placement, GI bleeding in the past on Coumadin and what sounds to be a mild amount of bleeding on aspirin, hypokalemia and renal insufficiency on daily Lasix, who presents back to me for follow up.  All-in-all, I think she is doing well at this point in time.  She is off of aspirin and Coumadin and states that she has noticed no additional issues with hematochezia or melena.  She had repeat lab work about a week ago that showed her potassium and creatinine was normalized.      1. Atrial fibrillation, chronic.  Continue metoprolol tartrate at current dose.  Not candidate for anticoagulation due to previous GI bleeding.  I am going to leave her off aspirin for at least another couple of weeks and then we may retry her on a low dose 81 mg daily.  2. Essential hypertension.  We will continue current medications.  I have also discussed with her about taking the Lasix p.r.n. if she has significant weight gain, lower extremity edema or shortness of air.  She seems to voice understanding at this time.    Atrial Fibrillation and Atrial Flutter  Assessment  • The patient has persistent atrial fibrillation  • This is non-valvular in etiology  • The patient's CHADS2-VASc score is 4  • A [id number] score of 2 or more is considered a high risk for a thromboembolic event

## 2017-01-27 ENCOUNTER — TELEPHONE (OUTPATIENT)
Dept: CARDIOLOGY | Facility: CLINIC | Age: 82
End: 2017-01-27

## 2017-01-27 NOTE — TELEPHONE ENCOUNTER
Patient wants to know if she can wear a life alert she has a pacemaker     She can be reached at [phone]

## 2017-02-13 ENCOUNTER — OFFICE VISIT (OUTPATIENT)
Dept: GASTROENTEROLOGY | Facility: CLINIC | Age: 82
End: 2017-02-13

## 2017-02-13 VITALS — WEIGHT: 129.4 LBS | HEIGHT: 60 IN | BODY MASS INDEX: 25.4 KG/M2

## 2017-02-13 DIAGNOSIS — K21.9 GASTROESOPHAGEAL REFLUX DISEASE WITHOUT ESOPHAGITIS: ICD-10-CM

## 2017-02-13 DIAGNOSIS — K62.5 RECTAL BLEEDING: Primary | ICD-10-CM

## 2017-02-13 DIAGNOSIS — Z87.19 HISTORY OF LOWER GI BLEEDING: ICD-10-CM

## 2017-02-13 DIAGNOSIS — K59.00 CONSTIPATION, UNSPECIFIED CONSTIPATION TYPE: ICD-10-CM

## 2017-02-13 DIAGNOSIS — K57.31 DIVERTICULOSIS OF LARGE INTESTINE WITH HEMORRHAGE: ICD-10-CM

## 2017-02-13 PROCEDURE — 99213 OFFICE O/P EST LOW 20 MIN: CPT | Performed by: NURSE PRACTITIONER

## 2017-02-13 NOTE — PROGRESS NOTES
Chief Complaint   Patient presents with   • Follow-up     hospital     Subjective     HPI  Cecelia Enriquez is a 89 y.o. female who presents for a hospital follow-up after discharge on January 11, 2017.  She was seen for recurrent rectal bleeding presumed to be either from a history of diverticulosis or AV malformations. Has known atrial fibrillation but cannot take anticoagulation therapy with previous GI bleeds.  She was seen by Dr. Richardson on January 13 with routine labs performed.  Hemoglobin within normal limits.  She denies bright red blood per rectum or black stools since discharge from the hospital.  Her only GI complaint is some mild constipation.  She has a hard stool daily.  Advised that she can take more than 1 Colace capsule to soften her stool and use MiraLAX as needed starting with a half a capful and advancing to 1-2 caps per day.  She is also started drinking nutritional supplement shakes and feels better with more energy.  We discussed good for sources of dietary fiber. She is also concerned about recurrence of UTIs.  She does not currently have any evidence of UTI symptoms     Past Medical History   Diagnosis Date   • Allergic rhinitis    • Anemia in CKD (chronic kidney disease)    • Anxiety    • Arteriovenous malformation    • Atrial fibrillation    • CHF (congestive heart failure)    • Depression    • Diverticulosis    • Dysuria    • Fever    • GERD (gastroesophageal reflux disease)    • High risk medication use    • Hyperglycemia    • Hyperlipidemia    • Hypertension    • Impacted cerumen of right ear    • Iron deficiency anemia    • Knee pain    • Lower GI bleed    • Mitral regurgitation    • Osteoporosis    • RBBB (right bundle branch block)    • Rheumatoid arthritis    • Thrombocytopenia    • Urinary incontinence        Social History     Social History   • Marital status:      Spouse name: N/A   • Number of children: N/A   • Years of education: N/A     Social History Main Topics   •  Smoking status: Former Smoker     Years: 5.00   • Smokeless tobacco: Never Used      Comment: CAFFEINE USE   • Alcohol use No   • Drug use: No   • Sexual activity: Defer     Other Topics Concern   • None     Social History Narrative         Current Outpatient Prescriptions:   •  Acetaminophen (TYLENOL EXTRA STRENGTH PO), Take  by mouth., Disp: , Rfl:   •  Ascorbic Acid (VITAMIN C) 500 MG capsule, Take 1 capsule by mouth daily., Disp: , Rfl:   •  busPIRone (BUSPAR) 15 MG tablet, TAKE 1 TABLET TWICE DAILY, Disp: 180 tablet, Rfl: 5  •  Cholecalciferol (VITAMIN D-3) 1000 UNITS capsule, Take 1 capsule by mouth daily., Disp: , Rfl:   •  furosemide (LASIX) 40 MG tablet, Take 1 tablet by mouth Daily As Needed (swelling)., Disp: 90 tablet, Rfl: 3  •  loratadine (CLARITIN) 10 MG tablet, Take 10 mg by mouth daily., Disp: , Rfl:   •  metoprolol tartrate (LOPRESSOR) 50 MG tablet, Take 1 tablet by mouth 2 (two) times a day. (Patient taking differently: Take 25 mg by mouth 2 (two) times a day.), Disp: 180 tablet, Rfl: 3  •  Multiple Vitamins-Minerals (CENTRUM SILVER) tablet, Take 1 tablet by mouth daily., Disp: , Rfl:   •  omeprazole (PriLOSEC) 40 MG capsule, TAKE 1 CAPSULE BY MOUTH DAILY., Disp: 90 capsule, Rfl: 1  •  potassium chloride (K-DUR,KLOR-CON) 20 MEQ CR tablet, Take 1 tablet by mouth Daily As Needed (when takes diuretic)., Disp: 30 tablet, Rfl: 5    Review of Systems   Constitutional: Negative for appetite change and unexpected weight change.   HENT: Negative for trouble swallowing.    Respiratory: Negative for choking and shortness of breath.    Cardiovascular: Negative for chest pain.   Gastrointestinal: Positive for constipation. Negative for abdominal distention, abdominal pain, blood in stool, diarrhea, nausea and vomiting.   Musculoskeletal: Negative for back pain.   Skin: Negative for color change.   Neurological: Negative for dizziness.   Hematological: Does not bruise/bleed easily.   Psychiatric/Behavioral:  Negative.        Objective   There were no vitals filed for this visit.    Physical Exam   Constitutional: She is oriented to person, place, and time. She appears well-developed and well-nourished.   HENT:   Head: Normocephalic and atraumatic.   Eyes: EOM are normal. Pupils are equal, round, and reactive to light.   Cardiovascular: Normal rate, regular rhythm and normal heart sounds.    Pulmonary/Chest: Effort normal.   Abdominal: Soft. Bowel sounds are normal. She exhibits no distension and no mass. There is no tenderness. No hernia.   Musculoskeletal: Normal range of motion.   Neurological: She is alert and oriented to person, place, and time.   Skin: Skin is dry.   Psychiatric: She has a normal mood and affect. Her behavior is normal. Judgment and thought content normal.       Assessment/Plan   Cecelia was seen today for follow-up.    Diagnoses and all orders for this visit:    Rectal bleeding  Comments:  Hospital follow-up January 2017- now resolved    History of lower GI bleeding  Comments:  Diverticular and AV malformation history    Gastroesophageal reflux disease without esophagitis  Comments:  Omeprazole 40 mg daily with excellent control of symptoms.    Diverticulosis of large intestine with hemorrhage  Comments:  Increase dietary fiber and fiber supplement    Constipation, unspecified constipation type  Comments:  Colace up to 3 capsules daily and MiraLAX as needed.  Discussed her actions at length with patient    For any additional questions, concerns or changes to your condition after today's office visit please contact the office at [phone].  Follow-up in 6 months.  Call or go to ER  if experience alarm symptoms such as weakness, dizziness, shortness of breath, rectal bleeding, or black stools.

## 2017-03-01 RX ORDER — ACETAMINOPHEN AND CODEINE PHOSPHATE 300; 30 MG/1; MG/1
TABLET ORAL
Qty: 40 TABLET | Refills: 0 | Status: SHIPPED | OUTPATIENT
Start: 2017-03-01 | End: 2017-07-20 | Stop reason: ALTCHOICE

## 2017-03-01 NOTE — TELEPHONE ENCOUNTER
No future appointment.    Last office visit 01/13/2017, kita 01/08/2017, filled 11/02/2016.    Thank you.

## 2017-03-13 RX ORDER — METOPROLOL TARTRATE 50 MG/1
TABLET, FILM COATED ORAL
Qty: 180 TABLET | Refills: 3 | Status: SHIPPED | OUTPATIENT
Start: 2017-03-13 | End: 2017-09-12

## 2017-04-26 ENCOUNTER — CLINICAL SUPPORT NO REQUIREMENTS (OUTPATIENT)
Dept: CARDIOLOGY | Facility: CLINIC | Age: 82
End: 2017-04-26

## 2017-04-26 DIAGNOSIS — I48.20 CHRONIC ATRIAL FIBRILLATION (HCC): Primary | ICD-10-CM

## 2017-04-26 DIAGNOSIS — I44.1 MOBITZ (TYPE) II ATRIOVENTRICULAR BLOCK: ICD-10-CM

## 2017-04-26 PROCEDURE — 93296 REM INTERROG EVL PM/IDS: CPT | Performed by: INTERNAL MEDICINE

## 2017-04-26 PROCEDURE — 93294 REM INTERROG EVL PM/LDLS PM: CPT | Performed by: INTERNAL MEDICINE

## 2017-05-19 ENCOUNTER — OFFICE VISIT (OUTPATIENT)
Dept: FAMILY MEDICINE CLINIC | Facility: CLINIC | Age: 82
End: 2017-05-19

## 2017-05-19 VITALS
OXYGEN SATURATION: 95 % | RESPIRATION RATE: 16 BRPM | SYSTOLIC BLOOD PRESSURE: 160 MMHG | BODY MASS INDEX: 24.77 KG/M2 | HEIGHT: 60 IN | HEART RATE: 77 BPM | DIASTOLIC BLOOD PRESSURE: 90 MMHG | TEMPERATURE: 98 F | WEIGHT: 126.2 LBS

## 2017-05-19 DIAGNOSIS — I48.91 ATRIAL FIBRILLATION, UNSPECIFIED TYPE (HCC): ICD-10-CM

## 2017-05-19 DIAGNOSIS — K21.9 GASTROESOPHAGEAL REFLUX DISEASE WITHOUT ESOPHAGITIS: ICD-10-CM

## 2017-05-19 DIAGNOSIS — Z79.899 HIGH RISK MEDICATION USE: ICD-10-CM

## 2017-05-19 DIAGNOSIS — F32.A DEPRESSION, UNSPECIFIED DEPRESSION TYPE: ICD-10-CM

## 2017-05-19 DIAGNOSIS — I48.20 CHRONIC A-FIB (HCC): Primary | ICD-10-CM

## 2017-05-19 DIAGNOSIS — R26.81 GAIT INSTABILITY: ICD-10-CM

## 2017-05-19 DIAGNOSIS — I10 ESSENTIAL HYPERTENSION: ICD-10-CM

## 2017-05-19 PROCEDURE — 99213 OFFICE O/P EST LOW 20 MIN: CPT | Performed by: FAMILY MEDICINE

## 2017-05-19 RX ORDER — OMEPRAZOLE 40 MG/1
40 CAPSULE, DELAYED RELEASE ORAL
Qty: 90 CAPSULE | Refills: 3 | Status: SHIPPED | OUTPATIENT
Start: 2017-05-19 | End: 2017-07-20 | Stop reason: ALTCHOICE

## 2017-05-20 LAB
BASOPHILS # BLD AUTO: 0 X10E3/UL (ref 0–0.2)
BASOPHILS NFR BLD AUTO: 0 %
BUN SERPL-MCNC: 19 MG/DL (ref 8–27)
BUN/CREAT SERPL: 20 (ref 12–28)
CALCIUM SERPL-MCNC: 9.9 MG/DL (ref 8.7–10.3)
CHLORIDE SERPL-SCNC: 93 MMOL/L (ref 96–106)
CO2 SERPL-SCNC: 27 MMOL/L (ref 18–29)
CREAT SERPL-MCNC: 0.95 MG/DL (ref 0.57–1)
EOSINOPHIL # BLD AUTO: 0.1 X10E3/UL (ref 0–0.4)
EOSINOPHIL NFR BLD AUTO: 1 %
ERYTHROCYTE [DISTWIDTH] IN BLOOD BY AUTOMATED COUNT: 15.7 % (ref 12.3–15.4)
GLUCOSE SERPL-MCNC: 93 MG/DL (ref 65–99)
HCT VFR BLD AUTO: 42.4 % (ref 34–46.6)
HGB BLD-MCNC: 14.1 G/DL (ref 11.1–15.9)
IMM GRANULOCYTES # BLD: 0 X10E3/UL (ref 0–0.1)
IMM GRANULOCYTES NFR BLD: 0 %
LYMPHOCYTES # BLD AUTO: 1.5 X10E3/UL (ref 0.7–3.1)
LYMPHOCYTES NFR BLD AUTO: 20 %
MAGNESIUM SERPL-MCNC: 1.9 MG/DL (ref 1.6–2.3)
MCH RBC QN AUTO: 29.7 PG (ref 26.6–33)
MCHC RBC AUTO-ENTMCNC: 33.3 G/DL (ref 31.5–35.7)
MCV RBC AUTO: 90 FL (ref 79–97)
MONOCYTES # BLD AUTO: 0.9 X10E3/UL (ref 0.1–0.9)
MONOCYTES NFR BLD AUTO: 12 %
NEUTROPHILS # BLD AUTO: 4.7 X10E3/UL (ref 1.4–7)
NEUTROPHILS NFR BLD AUTO: 67 %
PLATELET # BLD AUTO: 148 X10E3/UL (ref 150–379)
POTASSIUM SERPL-SCNC: 5.1 MMOL/L (ref 3.5–5.2)
RBC # BLD AUTO: 4.74 X10E6/UL (ref 3.77–5.28)
SODIUM SERPL-SCNC: 136 MMOL/L (ref 134–144)
VIT B12 SERPL-MCNC: 627 PG/ML (ref 211–946)
WBC # BLD AUTO: 7.3 X10E3/UL (ref 3.4–10.8)

## 2017-07-14 ENCOUNTER — OFFICE VISIT (OUTPATIENT)
Dept: FAMILY MEDICINE CLINIC | Facility: CLINIC | Age: 82
End: 2017-07-14

## 2017-07-14 VITALS
BODY MASS INDEX: 25.13 KG/M2 | OXYGEN SATURATION: 95 % | RESPIRATION RATE: 16 BRPM | DIASTOLIC BLOOD PRESSURE: 90 MMHG | HEIGHT: 60 IN | SYSTOLIC BLOOD PRESSURE: 140 MMHG | WEIGHT: 128 LBS | TEMPERATURE: 98.3 F | HEART RATE: 60 BPM

## 2017-07-14 DIAGNOSIS — M17.10 ARTHRITIS OF KNEE: Primary | ICD-10-CM

## 2017-07-14 PROBLEM — M17.9 OSTEOARTHRITIS OF KNEE: Status: ACTIVE | Noted: 2017-07-14

## 2017-07-14 PROCEDURE — 99213 OFFICE O/P EST LOW 20 MIN: CPT | Performed by: FAMILY MEDICINE

## 2017-07-14 RX ORDER — METHYLPREDNISOLONE 4 MG/1
TABLET ORAL
Qty: 21 TABLET | Refills: 0 | Status: SHIPPED | OUTPATIENT
Start: 2017-07-14 | End: 2017-07-14 | Stop reason: SDUPTHER

## 2017-07-14 RX ORDER — METHYLPREDNISOLONE 4 MG/1
TABLET ORAL
Qty: 21 TABLET | Refills: 0 | Status: SHIPPED | OUTPATIENT
Start: 2017-07-14 | End: 2017-09-12

## 2017-07-14 RX ORDER — POTASSIUM CHLORIDE 20 MEQ/1
TABLET, EXTENDED RELEASE ORAL
Qty: 90 TABLET | Refills: 5 | Status: SHIPPED | OUTPATIENT
Start: 2017-07-14 | End: 2018-04-21 | Stop reason: SDUPTHER

## 2017-07-14 NOTE — PROGRESS NOTES
EDGAR Enriquez is a 89 y.o. female who is here for follow up of arthritis pain in the left knee.  Also has noted some decreased hearing and is concerned about cerumen occlusion of the ear canals.  Listens to television most of the day.      Review of Systems   HENT: Positive for hearing loss.    Musculoskeletal: Positive for arthralgias.         Past Medical History:   Diagnosis Date   • Allergic rhinitis    • Anemia in CKD (chronic kidney disease)    • Anxiety    • Arteriovenous malformation    • Atrial fibrillation    • CHF (congestive heart failure)    • Depression    • Diverticulosis    • Dysuria    • Fever    • GERD (gastroesophageal reflux disease)    • High risk medication use    • Hyperglycemia    • Hyperlipidemia    • Hypertension    • Impacted cerumen of right ear    • Iron deficiency anemia    • Knee pain    • Lower GI bleed    • Mitral regurgitation    • Osteoporosis    • RBBB (right bundle branch block)    • Rheumatoid arthritis    • Thrombocytopenia    • Urinary incontinence        Past Surgical History:   Procedure Laterality Date   • COLONOSCOPY      12- DR HORTA RECOMMENDED/DONE AT [facility]   • PACEMAKER IMPLANTATION         Family History   Problem Relation Age of Onset   • Cancer Mother    • Cancer Father    • Diabetes Daughter    • Cancer Other    • Diabetes Other    • Hypertension Other    • Heart attack Other        Social History     Social History   • Marital status:      Spouse name: N/A   • Number of children: N/A   • Years of education: N/A     Occupational History   • Not on file.     Social History Main Topics   • Smoking status: Former Smoker     Years: 5.00   • Smokeless tobacco: Never Used      Comment: CAFFEINE USE   • Alcohol use No   • Drug use: No   • Sexual activity: Defer     Other Topics Concern   • Not on file     Social History Narrative         Physical Exam   Constitutional: She is oriented to person, place, and time. She appears well-developed and  well-nourished. No distress.   HENT:   Head: Normocephalic.   Left Ear: Tympanic membrane and ear canal normal.   Ears:    Mouth/Throat: Oropharynx is clear and moist.   Eyes: Pupils are equal, round, and reactive to light.   Musculoskeletal: She exhibits deformity.   Arthritic changes of both knees left greater than right.  Patient ambulates with a walker   Neurological: She is alert and oriented to person, place, and time. Coordination normal.   Skin: Skin is warm and dry.   Psychiatric: She has a normal mood and affect. Her behavior is normal. Judgment and thought content normal.   Vitals reviewed.        Assessment/Plan    Cecelia was seen today for knee pain and ear fullness.    Diagnoses and all orders for this visit:    Arthritis of knee  -     MethylPREDNISolone (MEDROL) 4 MG tablet; follow package directions        Patient presents mostly complaining of left knee pain and is asking about refill of Medrol Dosepak.  Also reports some hearing decrease and would like ears checked for possible irrigation.  Left ear canal appears not occluded.  Slight occlusion of right ear canal but able to see tympanic membrane and do not feel irrigation indicated at this time.  Recommend over-the-counter D proximal and return if needed.    This note includes information entered using a voice recognition dictation system.  Though reviewed, some nonsensible errors may remain.

## 2017-07-18 ENCOUNTER — TELEPHONE (OUTPATIENT)
Dept: FAMILY MEDICINE CLINIC | Facility: CLINIC | Age: 82
End: 2017-07-18

## 2017-07-18 NOTE — TELEPHONE ENCOUNTER
DOS is 7/20/17    Says she called 7/12 regarding needing [employer] referal. Asking if this one will be done soon as well.     [facility]    [phone] ext [phone]

## 2017-07-20 ENCOUNTER — OFFICE VISIT (OUTPATIENT)
Dept: CARDIOLOGY | Facility: CLINIC | Age: 82
End: 2017-07-20

## 2017-07-20 ENCOUNTER — CLINICAL SUPPORT NO REQUIREMENTS (OUTPATIENT)
Dept: CARDIOLOGY | Facility: CLINIC | Age: 82
End: 2017-07-20

## 2017-07-20 VITALS
SYSTOLIC BLOOD PRESSURE: 138 MMHG | WEIGHT: 124 LBS | DIASTOLIC BLOOD PRESSURE: 90 MMHG | HEART RATE: 60 BPM | HEIGHT: 60 IN | BODY MASS INDEX: 24.35 KG/M2

## 2017-07-20 DIAGNOSIS — Z95.0 PACEMAKER: ICD-10-CM

## 2017-07-20 DIAGNOSIS — E78.2 MIXED HYPERLIPIDEMIA: ICD-10-CM

## 2017-07-20 DIAGNOSIS — I10 ESSENTIAL HYPERTENSION: ICD-10-CM

## 2017-07-20 DIAGNOSIS — I44.2 THIRD DEGREE AV BLOCK (HCC): Primary | ICD-10-CM

## 2017-07-20 DIAGNOSIS — I48.20 CHRONIC A-FIB (HCC): Primary | ICD-10-CM

## 2017-07-20 PROCEDURE — 93000 ELECTROCARDIOGRAM COMPLETE: CPT | Performed by: INTERNAL MEDICINE

## 2017-07-20 PROCEDURE — 93279 PRGRMG DEV EVAL PM/LDLS PM: CPT | Performed by: INTERNAL MEDICINE

## 2017-07-20 PROCEDURE — 99214 OFFICE O/P EST MOD 30 MIN: CPT | Performed by: INTERNAL MEDICINE

## 2017-07-20 NOTE — PROGRESS NOTES
Cecelia Enriquez  11/11/1927  Date of Office Visit: 7/20/17  Encounter Provider: Guy Andrade MD  Place of Service: [facility] CARDIOLOGY      CHIEF COMPLAINT:   Chronic atrial fibrillation  Complete heart block status post dual-chamber pacemaker placement     HISTORY OF PRESENT ILLNESS: Dr. Richardson,  I had the pleasure of seeing your patient back in follow up today. As you well know, she is a very pleasant 89-year-old female with a medical history of chronic atrial fibrillation, complete heart block with [employer] dual chamber pacemaker placement, diverticulitis, mild mitral regurgitation and moderate tricuspid regurgitation, with chronic renal failure, hypokalemia, and a small amount of lower GI bleeding.  The aspirin was stopped or a short time, however she is back taking 81 mg a day without any significant bleeding issues.  Her main complaints at this time include bilateral knee pain that is at least moderate in intensity.  She has been on steroids recently secondary to the arthritis and discomfort. This is been present for multiple years and she is actually received injections in these knees before. She states that this is worse with walking and tends to ease up with rest along with the steroid therapy.  She denies any other associated symptoms.  No chest pain, palpitations or tachycardia.      Review of Systems   Constitution: Positive for malaise/fatigue. Negative for fever and weakness.   HENT: Negative for nosebleeds and sore throat.    Eyes: Negative for blurred vision and double vision.   Cardiovascular: Negative for chest pain, claudication, palpitations and syncope.   Respiratory: Negative for cough, shortness of breath and snoring.    Endocrine: Negative for cold intolerance, heat intolerance and polydipsia.   Skin: Negative for itching, poor wound healing and rash.   Musculoskeletal: Negative for joint pain, joint swelling, muscle weakness and myalgias.    Gastrointestinal: Negative for abdominal pain, melena, nausea and vomiting.   Neurological: Negative for light-headedness, loss of balance, seizures and vertigo.   Psychiatric/Behavioral: Negative for altered mental status and depression.          Past Medical History:   Diagnosis Date   • Allergic rhinitis    • Anemia in CKD (chronic kidney disease)    • Anxiety    • Arteriovenous malformation    • Atrial fibrillation    • CHF (congestive heart failure)    • Depression    • Diverticulosis    • Dysuria    • Fever    • GERD (gastroesophageal reflux disease)    • High risk medication use    • Hyperglycemia    • Hyperlipidemia    • Hypertension    • Impacted cerumen of right ear    • Iron deficiency anemia    • Knee pain    • Lower GI bleed    • Mitral regurgitation    • Osteoporosis    • RBBB (right bundle branch block)    • Rheumatoid arthritis    • Thrombocytopenia    • Urinary incontinence        The following portions of the patient's history were reviewed and updated as appropriate: Social history , Family history and Surgical history     Current Outpatient Prescriptions on File Prior to Visit   Medication Sig Dispense Refill   • Ascorbic Acid (VITAMIN C) 500 MG capsule Take 1 capsule by mouth daily.     • busPIRone (BUSPAR) 15 MG tablet TAKE 1 TABLET TWICE DAILY 180 tablet 5   • Cholecalciferol (VITAMIN D-3) 1000 UNITS capsule Take 1 capsule by mouth daily.     • furosemide (LASIX) 40 MG tablet Take 1 tablet by mouth Daily As Needed (swelling). (Patient taking differently: Take 40 mg by mouth 1 (One) Time Per Week.) 90 tablet 3   • MethylPREDNISolone (MEDROL) 4 MG tablet follow package directions 21 tablet 0   • metoprolol tartrate (LOPRESSOR) 50 MG tablet TAKE 1 TABLET BY MOUTH 2 TIMES A DAY. 180 tablet 3   • Multiple Vitamins-Minerals (CENTRUM SILVER) tablet Take 1 tablet by mouth daily.     • potassium chloride (K-DUR,KLOR-CON) 20 MEQ CR tablet TAKE 1 TABLET BY MOUTH DAILY AS NEEDED(WHEN TAKES DIURETIC) 90  "tablet 5   • [DISCONTINUED] Acetaminophen (TYLENOL EXTRA STRENGTH PO) Take  by mouth.     • [DISCONTINUED] acetaminophen-codeine (TYLENOL #3) 300-30 MG per tablet TAKE 1 TABLET BY MOUTH EVERY 4 HOURS AS NEEDED FOR PAIN 40 tablet 0   • [DISCONTINUED] loratadine (CLARITIN) 10 MG tablet Take 10 mg by mouth daily.     • [DISCONTINUED] omeprazole (priLOSEC) 40 MG capsule Take 1 capsule by mouth Every Morning Before Breakfast. 90 capsule 3     No current facility-administered medications on file prior to visit.        Allergies   Allergen Reactions   • Alendronate    • Atorvastatin    • Colesevelam Hcl    • Ezetimibe    • Hydrochlorothiazide    • Nitrofurantoin    • Statins        Vitals:    07/20/17 1524   BP: 138/90   Pulse: 60   Weight: 124 lb (56.2 kg)   Height: 60\" (152.4 cm)     Physical Exam   Constitutional: She is oriented to person, place, and time. She appears well-developed and well-nourished.   HENT:   Head: Normocephalic and atraumatic.   Eyes: Conjunctivae and EOM are normal. No scleral icterus.   Neck: Normal range of motion. Neck supple. Normal carotid pulses, no hepatojugular reflux and no JVD present. Carotid bruit is not present. No tracheal deviation present. No thyromegaly present.   Cardiovascular: Normal rate and regular rhythm.  Exam reveals no gallop and no friction rub.    No murmur heard.  Pulses:       Carotid pulses are 2+ on the right side, and 2+ on the left side.       Radial pulses are 2+ on the right side, and 2+ on the left side.        Femoral pulses are 2+ on the right side, and 2+ on the left side.       Dorsalis pedis pulses are 2+ on the right side, and 2+ on the left side.        Posterior tibial pulses are 2+ on the right side, and 2+ on the left side.   Pulmonary/Chest: Breath sounds normal. No respiratory distress. She has no decreased breath sounds. She has no wheezes. She has no rhonchi. She has no rales. She exhibits no tenderness.   Abdominal: Soft. Bowel sounds are normal. " She exhibits no distension. There is no tenderness. There is no rebound.   Musculoskeletal: She exhibits no edema or deformity.   Neurological: She is alert and oriented to person, place, and time. She has normal strength. No sensory deficit.   Skin: No rash noted. No erythema.   Psychiatric: She has a normal mood and affect. Her behavior is normal.     No components found for: CBC  No results found for: CMP  No components found for: LIPID  No results found for: BMP      ECG 12 Lead  Date/Time: 7/20/2017 3:56 PM  Performed by: YOSSI SANDOVAL  Authorized by: YOSSI SANDVOAL   Comparison: compared with previous ECG from 1/19/2017  Similar to previous ECG  Rhythm: atrial fibrillation and paced  Clinical impression: abnormal ECG               DISCUSSION/SUMMARY  89-year-old female with a medical history of atrial fibrillation that is chronic, dual chamber pacemaker placement, GI bleeding in the past on Coumadin and what sounds to be a mild amount of bleeding on aspirin, hypokalemia and renal insufficiency on daily Lasix, who presents back to me for follow up.  She is off Coumadin and has noticed no additional issues with hematochezia or melena.  She is currently tolerating low-dose aspirin therapy.  Ventricular rate is controlled.    1.  Atrial fibrillation, chronic.  Patient is ventricular paced 100% of the time.  A. Fib 100% of the time.    -Continue metoprolol tartrate at current dose.      -Not candidate for anticoagulation due to previous GI bleeding.      -continue aspirin 81 mg by mouth daily.  2. Essential hypertension.  We will continue current medications.  I would not recommend up titration of her medications.  Her blood pressure is borderline but she is also frail and 89 years of age.  3.     Dual-chamber pacemaker: Continue to follow pacemaker clinic.      Atrial Fibrillation and Atrial Flutter  Assessment  • The patient has persistent atrial fibrillation  • This is non-valvular in etiology  •  The patient's CHADS2-VASc score is 4  • A GLT9RT4-[id number] score of 2 or more is considered a high risk for a thromboembolic event

## 2017-07-25 DIAGNOSIS — M17.10 ARTHRITIS OF KNEE: ICD-10-CM

## 2017-07-25 RX ORDER — METHYLPREDNISOLONE 4 MG/1
TABLET ORAL
Qty: 21 TABLET | Refills: 0 | OUTPATIENT
Start: 2017-07-25

## 2017-09-12 DIAGNOSIS — M17.10 ARTHRITIS OF KNEE: ICD-10-CM

## 2017-09-12 RX ORDER — MELOXICAM 7.5 MG/1
7.5 TABLET ORAL DAILY
Qty: 30 TABLET | Refills: 1 | Status: SHIPPED | OUTPATIENT
Start: 2017-09-12 | End: 2017-09-12 | Stop reason: SDUPTHER

## 2017-09-12 RX ORDER — METOPROLOL TARTRATE 50 MG/1
50 TABLET, FILM COATED ORAL 2 TIMES DAILY
Qty: 180 TABLET | Refills: 3 | Status: SHIPPED | OUTPATIENT
Start: 2017-09-12 | End: 2018-03-07 | Stop reason: SDUPTHER

## 2017-09-12 RX ORDER — BUSPIRONE HYDROCHLORIDE 15 MG/1
TABLET ORAL
Qty: 180 TABLET | Refills: 5 | Status: SHIPPED | OUTPATIENT
Start: 2017-09-12 | End: 2018-04-21 | Stop reason: SDUPTHER

## 2017-09-12 RX ORDER — MELOXICAM 7.5 MG/1
TABLET ORAL
Qty: 90 TABLET | Refills: 1 | Status: SHIPPED | OUTPATIENT
Start: 2017-09-12 | End: 2018-04-21 | Stop reason: SDUPTHER

## 2017-09-19 ENCOUNTER — OFFICE VISIT (OUTPATIENT)
Dept: FAMILY MEDICINE CLINIC | Facility: CLINIC | Age: 82
End: 2017-09-19

## 2017-09-19 VITALS
HEART RATE: 60 BPM | OXYGEN SATURATION: 96 % | DIASTOLIC BLOOD PRESSURE: 80 MMHG | TEMPERATURE: 99 F | BODY MASS INDEX: 25.32 KG/M2 | HEIGHT: 60 IN | SYSTOLIC BLOOD PRESSURE: 148 MMHG | WEIGHT: 129 LBS

## 2017-09-19 DIAGNOSIS — M17.0 PRIMARY OSTEOARTHRITIS OF BOTH KNEES: ICD-10-CM

## 2017-09-19 DIAGNOSIS — I10 ESSENTIAL HYPERTENSION: ICD-10-CM

## 2017-09-19 DIAGNOSIS — I50.9 CONGESTIVE HEART FAILURE, UNSPECIFIED CONGESTIVE HEART FAILURE CHRONICITY, UNSPECIFIED CONGESTIVE HEART FAILURE TYPE: ICD-10-CM

## 2017-09-19 DIAGNOSIS — Z23 IMMUNIZATION DUE: ICD-10-CM

## 2017-09-19 DIAGNOSIS — I48.91 ATRIAL FIBRILLATION, UNSPECIFIED TYPE (HCC): ICD-10-CM

## 2017-09-19 DIAGNOSIS — Z79.899 HIGH RISK MEDICATION USE: Primary | ICD-10-CM

## 2017-09-19 LAB
ALBUMIN SERPL-MCNC: 4.8 G/DL (ref 3.5–5.2)
ALBUMIN/GLOB SERPL: 2.3 G/DL
ALP SERPL-CCNC: 84 U/L (ref 39–117)
ALT SERPL-CCNC: 8 U/L (ref 1–33)
AST SERPL-CCNC: 19 U/L (ref 1–32)
BASOPHILS # BLD AUTO: 0.01 10*3/MM3 (ref 0–0.2)
BASOPHILS NFR BLD AUTO: 0.1 % (ref 0–1.5)
BILIRUB SERPL-MCNC: 0.7 MG/DL (ref 0.1–1.2)
BUN SERPL-MCNC: 23 MG/DL (ref 8–23)
BUN/CREAT SERPL: 24 (ref 7–25)
CALCIUM SERPL-MCNC: 10.5 MG/DL (ref 8.6–10.5)
CHLORIDE SERPL-SCNC: 94 MMOL/L (ref 98–107)
CO2 SERPL-SCNC: 26.8 MMOL/L (ref 22–29)
CREAT SERPL-MCNC: 0.96 MG/DL (ref 0.57–1)
EOSINOPHIL # BLD AUTO: 0.08 10*3/MM3 (ref 0–0.7)
EOSINOPHIL NFR BLD AUTO: 1.1 % (ref 0.3–6.2)
ERYTHROCYTE [DISTWIDTH] IN BLOOD BY AUTOMATED COUNT: 14.5 % (ref 11.7–13)
GLOBULIN SER CALC-MCNC: 2.1 GM/DL
GLUCOSE SERPL-MCNC: 109 MG/DL (ref 65–99)
HCT VFR BLD AUTO: 43.7 % (ref 35.6–45.5)
HGB BLD-MCNC: 14.5 G/DL (ref 11.9–15.5)
IMM GRANULOCYTES # BLD: 0 10*3/MM3 (ref 0–0.03)
IMM GRANULOCYTES NFR BLD: 0 % (ref 0–0.5)
LYMPHOCYTES # BLD AUTO: 1.35 10*3/MM3 (ref 0.9–4.8)
LYMPHOCYTES NFR BLD AUTO: 17.9 % (ref 19.6–45.3)
MCH RBC QN AUTO: 31.4 PG (ref 26.9–32)
MCHC RBC AUTO-ENTMCNC: 33.2 G/DL (ref 32.4–36.3)
MCV RBC AUTO: 94.6 FL (ref 80.5–98.2)
MONOCYTES # BLD AUTO: 0.89 10*3/MM3 (ref 0.2–1.2)
MONOCYTES NFR BLD AUTO: 11.8 % (ref 5–12)
NEUTROPHILS # BLD AUTO: 5.21 10*3/MM3 (ref 1.9–8.1)
NEUTROPHILS NFR BLD AUTO: 69.1 % (ref 42.7–76)
PLATELET # BLD AUTO: 143 10*3/MM3 (ref 140–500)
POTASSIUM SERPL-SCNC: 4.7 MMOL/L (ref 3.5–5.2)
PROT SERPL-MCNC: 6.9 G/DL (ref 6–8.5)
RBC # BLD AUTO: 4.62 10*6/MM3 (ref 3.9–5.2)
SODIUM SERPL-SCNC: 134 MMOL/L (ref 136–145)
WBC # BLD AUTO: 7.54 10*3/MM3 (ref 4.5–10.7)

## 2017-09-19 PROCEDURE — 99213 OFFICE O/P EST LOW 20 MIN: CPT | Performed by: FAMILY MEDICINE

## 2017-09-19 PROCEDURE — G0009 ADMIN PNEUMOCOCCAL VACCINE: HCPCS | Performed by: FAMILY MEDICINE

## 2017-09-19 PROCEDURE — 90670 PCV13 VACCINE IM: CPT | Performed by: FAMILY MEDICINE

## 2017-09-19 RX ORDER — OMEPRAZOLE 40 MG/1
40 CAPSULE, DELAYED RELEASE ORAL DAILY
COMMUNITY
Start: 2017-08-25

## 2017-09-19 NOTE — PROGRESS NOTES
EDGAR Enriquez is a 89 y.o. female who is here for follow up of known atrial fibrillation and severe osteoarthritis especially of the knees.  Patient complains of severe arthritic complaints and recently resumed meloxicam.  Had prolonged discussion regarding risks versus benefits previously and again today.  Patient also requesting amoxicillin for future respiratory infections and this was discussed.  She will be turning 89 November 11 and is concerned about getting sick.  Discussed flu shot which patient does not want at this time.  Says she got the flu with previous flu shots and this also was discussed.  Does believe she had a pneumonia shot but it was 5-10 years ago?      Review of Systems   Musculoskeletal: Positive for arthralgias.   All other systems reviewed and are negative.        Past Medical History:   Diagnosis Date   • Allergic rhinitis    • Anemia in CKD (chronic kidney disease)    • Anxiety    • Arteriovenous malformation    • Atrial fibrillation    • CHF (congestive heart failure)    • Depression    • Diverticulosis    • Dysuria    • Fever    • GERD (gastroesophageal reflux disease)    • High risk medication use    • Hyperglycemia    • Hyperlipidemia    • Hypertension    • Impacted cerumen of right ear    • Iron deficiency anemia    • Knee pain    • Lower GI bleed    • Mitral regurgitation    • Osteoporosis    • RBBB (right bundle branch block)    • Rheumatoid arthritis    • Thrombocytopenia    • Urinary incontinence        Past Surgical History:   Procedure Laterality Date   • COLONOSCOPY      12- DR HORTA RECOMMENDED/DONE AT [facility]   • PACEMAKER IMPLANTATION         Family History   Problem Relation Age of Onset   • Cancer Mother    • Cancer Father    • Diabetes Daughter    • Cancer Other    • Diabetes Other    • Hypertension Other    • Heart attack Other        Social History     Social History   • Marital status:      Spouse name: N/A   • Number of children: N/A   • Years of  education: N/A     Occupational History   • Not on file.     Social History Main Topics   • Smoking status: Former Smoker     Years: 5.00   • Smokeless tobacco: Never Used      Comment: CAFFEINE USE   • Alcohol use No   • Drug use: No   • Sexual activity: Defer     Other Topics Concern   • Not on file     Social History Narrative         Physical Exam   Constitutional: She is oriented to person, place, and time. She appears well-developed and well-nourished. No distress.   HENT:   Nose: Nose normal.   Mouth/Throat: Oropharynx is clear and moist.   Eyes: Conjunctivae are normal. Pupils are equal, round, and reactive to light.   Neck: No JVD present. No thyromegaly present.   Cardiovascular: Normal rate.    Pulmonary/Chest: Effort normal. No respiratory distress.   Abdominal: Soft. She exhibits no distension. There is no tenderness.   Musculoskeletal: She exhibits deformity. She exhibits no edema.   Significant arthritic deformity of both knees.  Ambulates with walker   Neurological: She is alert and oriented to person, place, and time. She exhibits normal muscle tone. Coordination normal.   Skin: Skin is warm and dry.   Psychiatric: She has a normal mood and affect. Her behavior is normal. Judgment and thought content normal.   Nursing note and vitals reviewed.        Assessment/Plan    Cecelia was seen today for follow-up, arthritis and hypertension.    Diagnoses and all orders for this visit:    High risk medication use  -     CBC & Differential  -     Comprehensive Metabolic Panel    Primary osteoarthritis of both knees    Atrial fibrillation, unspecified type    Congestive heart failure, unspecified congestive heart failure chronicity, unspecified congestive heart failure type    Essential hypertension        Patient is here for routine follow-up especially of severe arthritis.  Despite risks patient elected to resuming meloxicam.  This was again discussed.  Also strongly recommended flu shot but patient would  like to wait.  Will give pneumonia shot and follow-up in 4 months or as needed.    This note includes information entered using a voice recognition dictation system.  Though reviewed, some nonsensible errors may remain.

## 2017-09-25 ENCOUNTER — TELEPHONE (OUTPATIENT)
Dept: FAMILY MEDICINE CLINIC | Facility: CLINIC | Age: 82
End: 2017-09-25

## 2017-09-25 ENCOUNTER — CLINICAL SUPPORT (OUTPATIENT)
Dept: FAMILY MEDICINE CLINIC | Facility: CLINIC | Age: 82
End: 2017-09-25

## 2017-09-25 DIAGNOSIS — N39.0 RECURRENT UTI: Primary | ICD-10-CM

## 2017-09-25 DIAGNOSIS — R39.9 UTI SYMPTOMS: Primary | ICD-10-CM

## 2017-09-25 LAB
BILIRUB BLD-MCNC: NEGATIVE MG/DL
CLARITY, POC: ABNORMAL
COLOR UR: YELLOW
GLUCOSE UR STRIP-MCNC: NEGATIVE MG/DL
KETONES UR QL: NEGATIVE
LEUKOCYTE EST, POC: ABNORMAL
NITRITE UR-MCNC: POSITIVE MG/ML
PH UR: 7.5 [PH] (ref 5–8)
PROT UR STRIP-MCNC: ABNORMAL MG/DL
RBC # UR STRIP: ABNORMAL /UL
SP GR UR: 1.01 (ref 1–1.03)
UROBILINOGEN UR QL: NORMAL

## 2017-09-25 PROCEDURE — 81003 URINALYSIS AUTO W/O SCOPE: CPT | Performed by: FAMILY MEDICINE

## 2017-09-25 RX ORDER — CEPHALEXIN 500 MG/1
500 CAPSULE ORAL 2 TIMES DAILY
Qty: 20 CAPSULE | Refills: 0 | Status: SHIPPED | OUTPATIENT
Start: 2017-09-25 | End: 2017-09-27 | Stop reason: SDUPTHER

## 2017-09-25 NOTE — TELEPHONE ENCOUNTER
Called & could not leave a message for patient to call the office.    Thank you.    ----- Message from Raji Richardson MD sent at 9/25/2017  4:24 PM EDT -----  Inform patient urine does appear to be infected.  Will send prescription for antibiotic while await culture results.

## 2017-09-25 NOTE — TELEPHONE ENCOUNTER
Called patient and told her to come in and drop off urine, she said that her  told her to drink some Ocean spray cranberry juice and that she is feeling fine but will come in later on today and leave urine.    Thank you.    ----- Message from Raji Richardson MD sent at 9/25/2017 12:33 PM EDT -----  Tell patient okay to bring in urine sample.  Do an office urinalysis and will send for culture if indicated.    ----- Message -----     From: Soren Echeverria Rep     Sent: 9/25/2017  10:36 AM       To: Raji Richardson MD    Patient would like to know if she can come up and drop off a urine sample. She believes she may have a UTI.    Thanks,  Mariah

## 2017-09-27 ENCOUNTER — TELEPHONE (OUTPATIENT)
Dept: FAMILY MEDICINE CLINIC | Facility: CLINIC | Age: 82
End: 2017-09-27

## 2017-09-27 DIAGNOSIS — N39.0 RECURRENT UTI: ICD-10-CM

## 2017-09-27 RX ORDER — CEPHALEXIN 500 MG/1
500 CAPSULE ORAL 2 TIMES DAILY
Qty: 20 CAPSULE | Refills: 0 | Status: SHIPPED | OUTPATIENT
Start: 2017-09-27 | End: 2017-11-01

## 2017-09-27 NOTE — TELEPHONE ENCOUNTER
Spoke with patient and notify her of positive UTI, did re-order antibiotic & sent it to local [facility] of [address]. Told her the directions.    Thank you.      ----- Message from Raji Richardson MD sent at 9/25/2017  4:24 PM EDT -----  Inform patient urine does appear to be infected.  Will send prescription for antibiotic while await culture results.

## 2017-09-28 ENCOUNTER — TELEPHONE (OUTPATIENT)
Dept: FAMILY MEDICINE CLINIC | Facility: CLINIC | Age: 82
End: 2017-09-28

## 2017-09-28 LAB
BACTERIA UR CULT: ABNORMAL
BACTERIA UR CULT: ABNORMAL
OTHER ANTIBIOTIC SUSC ISLT: ABNORMAL

## 2017-09-28 NOTE — TELEPHONE ENCOUNTER
Informed patient to complete antibiotic given. She said she had picked them up yesterday at 5 pm.    Thank you.    ----- Message from Raji Richardson MD sent at 9/28/2017  4:50 PM EDT -----  Inform patient urine culture is sensitive to antibiotic given.  Finish prescription as prescribed.

## 2017-10-25 ENCOUNTER — CLINICAL SUPPORT NO REQUIREMENTS (OUTPATIENT)
Dept: CARDIOLOGY | Facility: CLINIC | Age: 82
End: 2017-10-25

## 2017-10-25 DIAGNOSIS — I48.91 ATRIAL FIBRILLATION, UNSPECIFIED TYPE (HCC): Primary | ICD-10-CM

## 2017-10-25 PROCEDURE — 93294 REM INTERROG EVL PM/LDLS PM: CPT | Performed by: INTERNAL MEDICINE

## 2017-10-25 PROCEDURE — 93296 REM INTERROG EVL PM/IDS: CPT | Performed by: INTERNAL MEDICINE

## 2017-11-01 RX ORDER — AMOXICILLIN 500 MG/1
500 CAPSULE ORAL 2 TIMES DAILY
Qty: 20 CAPSULE | Refills: 0 | Status: SHIPPED | OUTPATIENT
Start: 2017-11-01 | End: 2017-12-19

## 2017-12-14 RX ORDER — AMOXICILLIN 500 MG/1
CAPSULE ORAL
Qty: 20 CAPSULE | Refills: 0 | OUTPATIENT
Start: 2017-12-14

## 2017-12-19 ENCOUNTER — OFFICE VISIT (OUTPATIENT)
Dept: FAMILY MEDICINE CLINIC | Facility: CLINIC | Age: 82
End: 2017-12-19

## 2017-12-19 VITALS
OXYGEN SATURATION: 98 % | WEIGHT: 129 LBS | SYSTOLIC BLOOD PRESSURE: 178 MMHG | RESPIRATION RATE: 16 BRPM | HEIGHT: 60 IN | TEMPERATURE: 98.1 F | HEART RATE: 60 BPM | BODY MASS INDEX: 25.32 KG/M2 | DIASTOLIC BLOOD PRESSURE: 86 MMHG

## 2017-12-19 DIAGNOSIS — M21.961 ACQUIRED DEFORMITY OF RIGHT FOOT: Primary | ICD-10-CM

## 2017-12-19 DIAGNOSIS — S91.301A OPEN WOUND OF RIGHT FOOT, INITIAL ENCOUNTER: ICD-10-CM

## 2017-12-19 PROCEDURE — 99213 OFFICE O/P EST LOW 20 MIN: CPT | Performed by: FAMILY MEDICINE

## 2017-12-19 RX ORDER — ERYTHROMYCIN 250 MG/1
250 TABLET, COATED ORAL 4 TIMES DAILY
Qty: 40 TABLET | Refills: 0 | Status: SHIPPED | OUTPATIENT
Start: 2017-12-19 | End: 2017-12-19 | Stop reason: CLARIF

## 2017-12-19 RX ORDER — CEPHALEXIN 500 MG/1
500 CAPSULE ORAL 3 TIMES DAILY
Qty: 30 CAPSULE | Refills: 0 | Status: SHIPPED | OUTPATIENT
Start: 2017-12-19 | End: 2017-12-27 | Stop reason: SDUPTHER

## 2017-12-19 NOTE — PROGRESS NOTES
EDGAR Enriquez is a 89 y.o. female who is here for follow up of sore on top of right second toe.  Patient insists on getting anabiotic therapy.  Has been using antibiotic cream.  Reports erythromycin works the best and Zithromax is totally ineffective.  Has severe abnormalities of the foot and recommend podiatry consult and probably should have special shoes made etc.      Review of Systems   Constitutional: Negative for chills, diaphoresis and fever.   Skin: Positive for wound.         Past Medical History:   Diagnosis Date   • Allergic rhinitis    • Anemia in CKD (chronic kidney disease)    • Anxiety    • Arteriovenous malformation    • Atrial fibrillation    • CHF (congestive heart failure)    • Depression    • Diverticulosis    • Dysuria    • Fever    • GERD (gastroesophageal reflux disease)    • High risk medication use    • Hyperglycemia    • Hyperlipidemia    • Hypertension    • Impacted cerumen of right ear    • Iron deficiency anemia    • Knee pain    • Lower GI bleed    • Mitral regurgitation    • Osteoporosis    • RBBB (right bundle branch block)    • Rheumatoid arthritis    • Thrombocytopenia    • Urinary incontinence        Past Surgical History:   Procedure Laterality Date   • COLONOSCOPY      12- DR HORTA RECOMMENDED/DONE AT [facility]   • PACEMAKER IMPLANTATION         Family History   Problem Relation Age of Onset   • Cancer Mother    • Cancer Father    • Diabetes Daughter    • Cancer Other    • Diabetes Other    • Hypertension Other    • Heart attack Other        Social History     Social History   • Marital status:      Spouse name: N/A   • Number of children: N/A   • Years of education: N/A     Occupational History   • Not on file.     Social History Main Topics   • Smoking status: Former Smoker     Years: 5.00   • Smokeless tobacco: Never Used      Comment: CAFFEINE USE   • Alcohol use No   • Drug use: No   • Sexual activity: Defer     Other Topics Concern   • Not on file      Social History Narrative         Physical Exam   Constitutional: She is oriented to person, place, and time. She appears well-developed and well-nourished. No distress.   HENT:   Head: Normocephalic.   Nose: Nose normal.   Mouth/Throat: Oropharynx is clear and moist.   Eyes: Conjunctivae and EOM are normal. Pupils are equal, round, and reactive to light.   Neck: Normal range of motion. No thyromegaly present.   Cardiovascular: Normal rate.    Pulmonary/Chest: Effort normal. No respiratory distress.   Musculoskeletal: She exhibits deformity. She exhibits no edema.   Ambulates with walker      Foot Structure and Biomechanics -  Her right foot exhibits hallux valgus and hammer toes.     Neurological: She is alert and oriented to person, place, and time.   Skin: Skin is warm and dry.   Psychiatric: She has a normal mood and affect. Her behavior is normal. Judgment and thought content normal.   Nursing note and vitals reviewed.        Assessment/Plan    Cecelia was seen today for toe pain, tinnitus, leg pain and hypertension.    Diagnoses and all orders for this visit:    Acquired deformity of right foot  -     Ambulatory Referral to Podiatry    Open wound of right foot, initial encounter    Other orders  -     erythromycin base (E-MYCIN) 250 MG tablet; Take 1 tablet by mouth 4 (Four) Times a Day.        Patient presents with an open wound on the top of the second toe related to significant deformity.  Distal foot is red and very cold to the touch.  Do not really see any evidence of infection but patient basically insists must have antibiotic therapy and specifically wants erythromycin?  This was discussed and probably this is the most benign choice of antibiotic coverage.  Reports leg pain after getting recent amoxicillin prescription??  Patient does have significant deformity of her right foot and recommend podiatry consultation and probably will need specially fitted shoes.    This note includes information  entered using a voice recognition dictation system.  Though reviewed, some nonsensible errors may remain.

## 2017-12-27 ENCOUNTER — TELEPHONE (OUTPATIENT)
Dept: FAMILY MEDICINE CLINIC | Facility: CLINIC | Age: 82
End: 2017-12-27

## 2017-12-27 RX ORDER — CEPHALEXIN 500 MG/1
CAPSULE ORAL
Qty: 30 CAPSULE | Refills: 0 | Status: SHIPPED | OUTPATIENT
Start: 2017-12-27 | End: 2018-04-21

## 2017-12-27 NOTE — TELEPHONE ENCOUNTER
Approved or denied?    Thank you.    ----- Message from Iesha Martini sent at 12/27/2017 10:13 AM EST -----  PT WOULD LIKE TO KNOW IF YOU COULD REFILL THESE.  FOOT IS MUCH BETTER BUT STILL HURTS.    cephalexin (KEFLEX) 500 MG capsule  Sig: Take 1 capsule by mouth 3 (Three) Times a Day.      Pharmacy:  [facility] [address] [place] [address] - [address] AT [facility] [phone] [facility] [phone]

## 2017-12-27 NOTE — TELEPHONE ENCOUNTER
Approved or denied?     Thank you.     ----- Message from Iesha Martini sent at 12/27/2017 10:13 AM EST -----  PT WOULD LIKE TO KNOW IF YOU COULD REFILL THESE.  FOOT IS MUCH BETTER BUT STILL HURTS.     cephalexin (KEFLEX) 500 MG capsule  Sig: Take 1 capsule by mouth 3 (Three) Times a Day.        Pharmacy:  [facility] [address] [place] [address] - [address] AT [facility] [phone] [facility] [phone]

## 2018-02-14 ENCOUNTER — CLINICAL SUPPORT NO REQUIREMENTS (OUTPATIENT)
Dept: CARDIOLOGY | Facility: CLINIC | Age: 83
End: 2018-02-14

## 2018-02-14 DIAGNOSIS — I48.20 CHRONIC ATRIAL FIBRILLATION (HCC): Primary | ICD-10-CM

## 2018-02-14 PROCEDURE — 93294 REM INTERROG EVL PM/LDLS PM: CPT | Performed by: INTERNAL MEDICINE

## 2018-02-14 PROCEDURE — 93296 REM INTERROG EVL PM/IDS: CPT | Performed by: INTERNAL MEDICINE

## 2018-03-07 RX ORDER — METOPROLOL TARTRATE 50 MG/1
TABLET, FILM COATED ORAL
Qty: 180 TABLET | Refills: 3 | Status: SHIPPED | OUTPATIENT
Start: 2018-03-07 | End: 2018-04-21 | Stop reason: SDUPTHER

## 2018-04-21 ENCOUNTER — APPOINTMENT (OUTPATIENT)
Dept: GENERAL RADIOLOGY | Facility: HOSPITAL | Age: 83
End: 2018-04-21

## 2018-04-21 ENCOUNTER — APPOINTMENT (OUTPATIENT)
Dept: CT IMAGING | Facility: HOSPITAL | Age: 83
End: 2018-04-21

## 2018-04-21 ENCOUNTER — HOSPITAL ENCOUNTER (INPATIENT)
Facility: HOSPITAL | Age: 83
LOS: 6 days | Discharge: NURSING, REHAB OR LONG-TERM CARE | End: 2018-04-27
Attending: EMERGENCY MEDICINE | Admitting: HOSPITALIST

## 2018-04-21 DIAGNOSIS — T79.6XXA TRAUMATIC RHABDOMYOLYSIS, INITIAL ENCOUNTER (HCC): ICD-10-CM
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
## 2018-04-21 DIAGNOSIS — S62.102A CLOSED FRACTURE OF LEFT WRIST, INITIAL ENCOUNTER: ICD-10-CM

## 2018-04-21 DIAGNOSIS — S72.002A CLOSED FRACTURE OF LEFT HIP, INITIAL ENCOUNTER (HCC): ICD-10-CM

## 2018-04-21 DIAGNOSIS — Z74.09 IMPAIRED FUNCTIONAL MOBILITY AND ACTIVITY TOLERANCE: ICD-10-CM

## 2018-04-21 PROBLEM — E83.52 HYPERCALCEMIA: Status: ACTIVE | Noted: 2018-04-21

## 2018-04-21 PROBLEM — D72.829 LEUKOCYTOSIS: Status: ACTIVE | Noted: 2018-04-21

## 2018-04-21 PROBLEM — M06.9 RHEUMATOID ARTHRITIS (HCC): Chronic | Status: ACTIVE | Noted: 2018-04-21

## 2018-04-21 LAB
ALBUMIN SERPL-MCNC: 4.4 G/DL (ref 3.5–5.2)
ALBUMIN/GLOB SERPL: 1.4 G/DL
ALP SERPL-CCNC: 101 U/L (ref 39–117)
ALT SERPL W P-5'-P-CCNC: 11 U/L (ref 1–33)
ANION GAP SERPL CALCULATED.3IONS-SCNC: 17.6 MMOL/L
AST SERPL-CCNC: 30 U/L (ref 1–32)
BACTERIA UR QL AUTO: NORMAL /HPF
BASOPHILS # BLD AUTO: 0.01 10*3/MM3 (ref 0–0.2)
BASOPHILS NFR BLD AUTO: 0 % (ref 0–1.5)
BILIRUB SERPL-MCNC: 1.3 MG/DL (ref 0.1–1.2)
BILIRUB UR QL STRIP: NEGATIVE
BUN BLD-MCNC: 18 MG/DL (ref 8–23)
BUN/CREAT SERPL: 20.5 (ref 7–25)
CALCIUM SPEC-SCNC: 10.5 MG/DL (ref 8.2–9.6)
CHLORIDE SERPL-SCNC: 92 MMOL/L (ref 98–107)
CK SERPL-CCNC: 342 U/L (ref 20–180)
CLARITY UR: CLEAR
CO2 SERPL-SCNC: 26.4 MMOL/L (ref 22–29)
COLOR UR: YELLOW
CREAT BLD-MCNC: 0.88 MG/DL (ref 0.57–1)
D-LACTATE SERPL-SCNC: 3.3 MMOL/L (ref 0.5–2)
DEPRECATED RDW RBC AUTO: 47 FL (ref 37–54)
EOSINOPHIL # BLD AUTO: 0 10*3/MM3 (ref 0–0.7)
EOSINOPHIL NFR BLD AUTO: 0 % (ref 0.3–6.2)
ERYTHROCYTE [DISTWIDTH] IN BLOOD BY AUTOMATED COUNT: 13.8 % (ref 11.7–13)
GFR SERPL CREATININE-BSD FRML MDRD: 60 ML/MIN/1.73
GLOBULIN UR ELPH-MCNC: 3.2 GM/DL
GLUCOSE BLD-MCNC: 175 MG/DL (ref 65–99)
GLUCOSE UR STRIP-MCNC: NEGATIVE MG/DL
HCT VFR BLD AUTO: 45.8 % (ref 35.6–45.5)
HGB BLD-MCNC: 15.2 G/DL (ref 11.9–15.5)
HGB UR QL STRIP.AUTO: ABNORMAL
HOLD SPECIMEN: NORMAL
HYALINE CASTS UR QL AUTO: NORMAL /LPF
IMM GRANULOCYTES # BLD: 0.08 10*3/MM3 (ref 0–0.03)
IMM GRANULOCYTES NFR BLD: 0.4 % (ref 0–0.5)
KETONES UR QL STRIP: NEGATIVE
LEUKOCYTE ESTERASE UR QL STRIP.AUTO: NEGATIVE
LYMPHOCYTES # BLD AUTO: 1.05 10*3/MM3 (ref 0.9–4.8)
LYMPHOCYTES NFR BLD AUTO: 5.2 % (ref 19.6–45.3)
MCH RBC QN AUTO: 31 PG (ref 26.9–32)
MCHC RBC AUTO-ENTMCNC: 33.2 G/DL (ref 32.4–36.3)
MCV RBC AUTO: 93.3 FL (ref 80.5–98.2)
MONOCYTES # BLD AUTO: 1.89 10*3/MM3 (ref 0.2–1.2)
MONOCYTES NFR BLD AUTO: 9.4 % (ref 5–12)
NEUTROPHILS # BLD AUTO: 16.99 10*3/MM3 (ref 1.9–8.1)
NEUTROPHILS NFR BLD AUTO: 85 % (ref 42.7–76)
NITRITE UR QL STRIP: NEGATIVE
PH UR STRIP.AUTO: 7 [PH] (ref 5–8)
PLATELET # BLD AUTO: 153 10*3/MM3 (ref 140–500)
PMV BLD AUTO: 13.4 FL (ref 6–12)
POTASSIUM BLD-SCNC: 5 MMOL/L (ref 3.5–5.2)
PROCALCITONIN SERPL-MCNC: 0.07 NG/ML (ref 0.1–0.25)
PROT SERPL-MCNC: 7.6 G/DL (ref 6–8.5)
PROT UR QL STRIP: ABNORMAL
RBC # BLD AUTO: 4.91 10*6/MM3 (ref 3.9–5.2)
RBC # UR: NORMAL /HPF
REF LAB TEST METHOD: NORMAL
SODIUM BLD-SCNC: 136 MMOL/L (ref 136–145)
SP GR UR STRIP: 1.01 (ref 1–1.03)
SQUAMOUS #/AREA URNS HPF: NORMAL /HPF
UROBILINOGEN UR QL STRIP: ABNORMAL
WBC NRBC COR # BLD: 20.02 10*3/MM3 (ref 4.5–10.7)
WBC UR QL AUTO: NORMAL /HPF

## 2018-04-21 PROCEDURE — 93010 ELECTROCARDIOGRAM REPORT: CPT | Performed by: INTERNAL MEDICINE

## 2018-04-21 PROCEDURE — 83605 ASSAY OF LACTIC ACID: CPT | Performed by: PHYSICIAN ASSISTANT

## 2018-04-21 PROCEDURE — 86901 BLOOD TYPING SEROLOGIC RH(D): CPT | Performed by: ORTHOPAEDIC SURGERY

## 2018-04-21 PROCEDURE — 70450 CT HEAD/BRAIN W/O DYE: CPT

## 2018-04-21 PROCEDURE — 82550 ASSAY OF CK (CPK): CPT | Performed by: EMERGENCY MEDICINE

## 2018-04-21 PROCEDURE — 86900 BLOOD TYPING SEROLOGIC ABO: CPT | Performed by: ORTHOPAEDIC SURGERY

## 2018-04-21 PROCEDURE — 87040 BLOOD CULTURE FOR BACTERIA: CPT | Performed by: PHYSICIAN ASSISTANT

## 2018-04-21 PROCEDURE — 84145 PROCALCITONIN (PCT): CPT | Performed by: PHYSICIAN ASSISTANT

## 2018-04-21 PROCEDURE — 80053 COMPREHEN METABOLIC PANEL: CPT | Performed by: EMERGENCY MEDICINE

## 2018-04-21 PROCEDURE — 81001 URINALYSIS AUTO W/SCOPE: CPT | Performed by: PHYSICIAN ASSISTANT

## 2018-04-21 PROCEDURE — 73552 X-RAY EXAM OF FEMUR 2/>: CPT

## 2018-04-21 PROCEDURE — 99285 EMERGENCY DEPT VISIT HI MDM: CPT

## 2018-04-21 PROCEDURE — 25010000002 ONDANSETRON PER 1 MG: Performed by: EMERGENCY MEDICINE

## 2018-04-21 PROCEDURE — 85610 PROTHROMBIN TIME: CPT | Performed by: ORTHOPAEDIC SURGERY

## 2018-04-21 PROCEDURE — 25010000002 MORPHINE PER 10 MG: Performed by: EMERGENCY MEDICINE

## 2018-04-21 PROCEDURE — 86850 RBC ANTIBODY SCREEN: CPT | Performed by: ORTHOPAEDIC SURGERY

## 2018-04-21 PROCEDURE — 36415 COLL VENOUS BLD VENIPUNCTURE: CPT

## 2018-04-21 PROCEDURE — 73502 X-RAY EXAM HIP UNI 2-3 VIEWS: CPT

## 2018-04-21 PROCEDURE — 73110 X-RAY EXAM OF WRIST: CPT

## 2018-04-21 PROCEDURE — 93005 ELECTROCARDIOGRAM TRACING: CPT | Performed by: PHYSICIAN ASSISTANT

## 2018-04-21 PROCEDURE — 71045 X-RAY EXAM CHEST 1 VIEW: CPT

## 2018-04-21 PROCEDURE — 85025 COMPLETE CBC W/AUTO DIFF WBC: CPT | Performed by: EMERGENCY MEDICINE

## 2018-04-21 PROCEDURE — 85730 THROMBOPLASTIN TIME PARTIAL: CPT | Performed by: ORTHOPAEDIC SURGERY

## 2018-04-21 RX ORDER — MELOXICAM 15 MG/1
15 TABLET ORAL DAILY
COMMUNITY
End: 2018-04-27 | Stop reason: HOSPADM

## 2018-04-21 RX ORDER — ASPIRIN 81 MG/1
81 TABLET ORAL DAILY
Status: DISCONTINUED | OUTPATIENT
Start: 2018-04-22 | End: 2018-04-27 | Stop reason: HOSPADM

## 2018-04-21 RX ORDER — SODIUM CHLORIDE 0.9 % (FLUSH) 0.9 %
1-10 SYRINGE (ML) INJECTION AS NEEDED
Status: DISCONTINUED | OUTPATIENT
Start: 2018-04-21 | End: 2018-04-27 | Stop reason: HOSPADM

## 2018-04-21 RX ORDER — BUSPIRONE HYDROCHLORIDE 15 MG/1
15 TABLET ORAL 2 TIMES DAILY
Status: DISCONTINUED | OUTPATIENT
Start: 2018-04-22 | End: 2018-04-27 | Stop reason: HOSPADM

## 2018-04-21 RX ORDER — METOPROLOL TARTRATE 50 MG/1
50 TABLET, FILM COATED ORAL 2 TIMES DAILY
COMMUNITY

## 2018-04-21 RX ORDER — BUSPIRONE HYDROCHLORIDE 15 MG/1
15 TABLET ORAL 2 TIMES DAILY
COMMUNITY

## 2018-04-21 RX ORDER — SODIUM CHLORIDE, SODIUM LACTATE, POTASSIUM CHLORIDE, CALCIUM CHLORIDE 600; 310; 30; 20 MG/100ML; MG/100ML; MG/100ML; MG/100ML
75 INJECTION, SOLUTION INTRAVENOUS CONTINUOUS
Status: DISCONTINUED | OUTPATIENT
Start: 2018-04-21 | End: 2018-04-24

## 2018-04-21 RX ORDER — ONDANSETRON 2 MG/ML
4 INJECTION INTRAMUSCULAR; INTRAVENOUS ONCE
Status: COMPLETED | OUTPATIENT
Start: 2018-04-21 | End: 2018-04-21

## 2018-04-21 RX ORDER — ACETAMINOPHEN 325 MG/1
650 TABLET ORAL EVERY 4 HOURS PRN
Status: DISCONTINUED | OUTPATIENT
Start: 2018-04-21 | End: 2018-04-27 | Stop reason: HOSPADM

## 2018-04-21 RX ORDER — METOPROLOL TARTRATE 50 MG/1
50 TABLET, FILM COATED ORAL EVERY 12 HOURS SCHEDULED
Status: DISCONTINUED | OUTPATIENT
Start: 2018-04-22 | End: 2018-04-27 | Stop reason: HOSPADM

## 2018-04-21 RX ORDER — FLUTICASONE PROPIONATE 50 MCG
2 SPRAY, SUSPENSION (ML) NASAL DAILY
COMMUNITY

## 2018-04-21 RX ORDER — FLUTICASONE PROPIONATE 50 MCG
2 SPRAY, SUSPENSION (ML) NASAL DAILY
Status: DISCONTINUED | OUTPATIENT
Start: 2018-04-22 | End: 2018-04-27 | Stop reason: HOSPADM

## 2018-04-21 RX ORDER — NALOXONE HCL 0.4 MG/ML
0.4 VIAL (ML) INJECTION
Status: DISCONTINUED | OUTPATIENT
Start: 2018-04-21 | End: 2018-04-27 | Stop reason: HOSPADM

## 2018-04-21 RX ORDER — MORPHINE SULFATE 2 MG/ML
1 INJECTION, SOLUTION INTRAMUSCULAR; INTRAVENOUS EVERY 4 HOURS PRN
Status: DISCONTINUED | OUTPATIENT
Start: 2018-04-21 | End: 2018-04-27 | Stop reason: HOSPADM

## 2018-04-21 RX ORDER — ONDANSETRON 2 MG/ML
4 INJECTION INTRAMUSCULAR; INTRAVENOUS EVERY 6 HOURS PRN
Status: DISCONTINUED | OUTPATIENT
Start: 2018-04-21 | End: 2018-04-27 | Stop reason: HOSPADM

## 2018-04-21 RX ORDER — CLOPIDOGREL BISULFATE 75 MG/1
75 TABLET ORAL DAILY
Status: ON HOLD | COMMUNITY
End: 2018-04-25

## 2018-04-21 RX ORDER — MORPHINE SULFATE 2 MG/ML
2 INJECTION, SOLUTION INTRAMUSCULAR; INTRAVENOUS ONCE
Status: COMPLETED | OUTPATIENT
Start: 2018-04-21 | End: 2018-04-21

## 2018-04-21 RX ORDER — PANTOPRAZOLE SODIUM 40 MG/1
40 TABLET, DELAYED RELEASE ORAL EVERY MORNING
Status: DISCONTINUED | OUTPATIENT
Start: 2018-04-22 | End: 2018-04-27 | Stop reason: HOSPADM

## 2018-04-21 RX ORDER — HYDROCODONE BITARTRATE AND ACETAMINOPHEN 5; 325 MG/1; MG/1
1 TABLET ORAL EVERY 4 HOURS PRN
Status: DISCONTINUED | OUTPATIENT
Start: 2018-04-21 | End: 2018-04-27 | Stop reason: HOSPADM

## 2018-04-21 RX ORDER — MAGNESIUM OXIDE 400 MG/1
400 TABLET ORAL DAILY
COMMUNITY
End: 2018-04-27 | Stop reason: HOSPADM

## 2018-04-21 RX ADMIN — ONDANSETRON 4 MG: 2 INJECTION INTRAMUSCULAR; INTRAVENOUS at 17:16

## 2018-04-21 RX ADMIN — MORPHINE SULFATE 2 MG: 2 INJECTION, SOLUTION INTRAMUSCULAR; INTRAVENOUS at 17:17

## 2018-04-21 RX ADMIN — METOPROLOL TARTRATE 50 MG: 50 TABLET, FILM COATED ORAL at 23:38

## 2018-04-21 RX ADMIN — SODIUM CHLORIDE 500 ML: 9 INJECTION, SOLUTION INTRAVENOUS at 17:16

## 2018-04-21 RX ADMIN — BUSPIRONE HYDROCHLORIDE 15 MG: 15 TABLET ORAL at 23:38

## 2018-04-21 RX ADMIN — SODIUM CHLORIDE, POTASSIUM CHLORIDE, SODIUM LACTATE AND CALCIUM CHLORIDE 100 ML/HR: 600; 310; 30; 20 INJECTION, SOLUTION INTRAVENOUS at 23:37

## 2018-04-22 ENCOUNTER — APPOINTMENT (OUTPATIENT)
Dept: GENERAL RADIOLOGY | Facility: HOSPITAL | Age: 83
End: 2018-04-22

## 2018-04-22 ENCOUNTER — ANESTHESIA (OUTPATIENT)
Dept: PERIOP | Facility: HOSPITAL | Age: 83
End: 2018-04-22

## 2018-04-22 ENCOUNTER — ANESTHESIA EVENT (OUTPATIENT)
Dept: PERIOP | Facility: HOSPITAL | Age: 83
End: 2018-04-22

## 2018-04-22 PROBLEM — E87.1 HYPONATREMIA: Status: ACTIVE | Noted: 2018-04-22

## 2018-04-22 PROBLEM — Z01.810 PREOP CARDIOVASCULAR EXAM: Status: ACTIVE | Noted: 2018-04-22

## 2018-04-22 LAB
ABO GROUP BLD: NORMAL
ALBUMIN SERPL-MCNC: 3.4 G/DL (ref 3.5–5.2)
ALBUMIN/GLOB SERPL: 1.2 G/DL
ALP SERPL-CCNC: 75 U/L (ref 39–117)
ALT SERPL W P-5'-P-CCNC: 10 U/L (ref 1–33)
ANION GAP SERPL CALCULATED.3IONS-SCNC: 13.2 MMOL/L
APTT PPP: 33.1 SECONDS (ref 22.7–35.4)
AST SERPL-CCNC: 24 U/L (ref 1–32)
BASOPHILS # BLD AUTO: 0.01 10*3/MM3 (ref 0–0.2)
BASOPHILS NFR BLD AUTO: 0.1 % (ref 0–1.5)
BILIRUB SERPL-MCNC: 1 MG/DL (ref 0.1–1.2)
BLD GP AB SCN SERPL QL: NEGATIVE
BUN BLD-MCNC: 21 MG/DL (ref 8–23)
BUN/CREAT SERPL: 25.3 (ref 7–25)
CALCIUM SPEC-SCNC: 9.6 MG/DL (ref 8.2–9.6)
CHLORIDE SERPL-SCNC: 97 MMOL/L (ref 98–107)
CK SERPL-CCNC: 262 U/L (ref 20–180)
CO2 SERPL-SCNC: 23.8 MMOL/L (ref 22–29)
CREAT BLD-MCNC: 0.83 MG/DL (ref 0.57–1)
D-LACTATE SERPL-SCNC: 1.1 MMOL/L (ref 0.5–2)
D-LACTATE SERPL-SCNC: 1.5 MMOL/L (ref 0.5–2)
DEPRECATED RDW RBC AUTO: 47.7 FL (ref 37–54)
EOSINOPHIL # BLD AUTO: 0.01 10*3/MM3 (ref 0–0.7)
EOSINOPHIL NFR BLD AUTO: 0.1 % (ref 0.3–6.2)
ERYTHROCYTE [DISTWIDTH] IN BLOOD BY AUTOMATED COUNT: 14 % (ref 11.7–13)
GFR SERPL CREATININE-BSD FRML MDRD: 65 ML/MIN/1.73
GLOBULIN UR ELPH-MCNC: 2.8 GM/DL
GLUCOSE BLD-MCNC: 112 MG/DL (ref 65–99)
HCT VFR BLD AUTO: 40.4 % (ref 35.6–45.5)
HGB BLD-MCNC: 12.8 G/DL (ref 11.9–15.5)
IMM GRANULOCYTES # BLD: 0.04 10*3/MM3 (ref 0–0.03)
IMM GRANULOCYTES NFR BLD: 0.3 % (ref 0–0.5)
INR PPP: 1.27 (ref 0.9–1.1)
LYMPHOCYTES # BLD AUTO: 1.44 10*3/MM3 (ref 0.9–4.8)
LYMPHOCYTES NFR BLD AUTO: 9.5 % (ref 19.6–45.3)
MCH RBC QN AUTO: 30.1 PG (ref 26.9–32)
MCHC RBC AUTO-ENTMCNC: 31.7 G/DL (ref 32.4–36.3)
MCV RBC AUTO: 95.1 FL (ref 80.5–98.2)
MONOCYTES # BLD AUTO: 1.94 10*3/MM3 (ref 0.2–1.2)
MONOCYTES NFR BLD AUTO: 12.8 % (ref 5–12)
NEUTROPHILS # BLD AUTO: 11.7 10*3/MM3 (ref 1.9–8.1)
NEUTROPHILS NFR BLD AUTO: 77.2 % (ref 42.7–76)
PLATELET # BLD AUTO: 127 10*3/MM3 (ref 140–500)
PMV BLD AUTO: 13 FL (ref 6–12)
POTASSIUM BLD-SCNC: 4.7 MMOL/L (ref 3.5–5.2)
PROT SERPL-MCNC: 6.2 G/DL (ref 6–8.5)
PROTHROMBIN TIME: 15.7 SECONDS (ref 11.7–14.2)
RBC # BLD AUTO: 4.25 10*6/MM3 (ref 3.9–5.2)
RH BLD: POSITIVE
SODIUM BLD-SCNC: 134 MMOL/L (ref 136–145)
T&S EXPIRATION DATE: NORMAL
WBC NRBC COR # BLD: 15.14 10*3/MM3 (ref 4.5–10.7)

## 2018-04-22 PROCEDURE — C1713 ANCHOR/SCREW BN/BN,TIS/BN: HCPCS | Performed by: ORTHOPAEDIC SURGERY

## 2018-04-22 PROCEDURE — 0QS706Z REPOSITION LEFT UPPER FEMUR WITH INTRAMEDULLARY INTERNAL FIXATION DEVICE, OPEN APPROACH: ICD-10-PCS | Performed by: ORTHOPAEDIC SURGERY

## 2018-04-22 PROCEDURE — 25010000002 FENTANYL CITRATE (PF) 100 MCG/2ML SOLUTION: Performed by: NURSE ANESTHETIST, CERTIFIED REGISTERED

## 2018-04-22 PROCEDURE — 25010000003 CEFAZOLIN IN DEXTROSE 2-4 GM/100ML-% SOLUTION: Performed by: ORTHOPAEDIC SURGERY

## 2018-04-22 PROCEDURE — 82550 ASSAY OF CK (CPK): CPT | Performed by: HOSPITALIST

## 2018-04-22 PROCEDURE — 76000 FLUOROSCOPY <1 HR PHYS/QHP: CPT

## 2018-04-22 PROCEDURE — 73502 X-RAY EXAM HIP UNI 2-3 VIEWS: CPT

## 2018-04-22 PROCEDURE — 25010000002 FENTANYL CITRATE (PF) 100 MCG/2ML SOLUTION: Performed by: ANESTHESIOLOGY

## 2018-04-22 PROCEDURE — 25010000002 ENOXAPARIN PER 10 MG: Performed by: ORTHOPAEDIC SURGERY

## 2018-04-22 PROCEDURE — 85025 COMPLETE CBC W/AUTO DIFF WBC: CPT | Performed by: HOSPITALIST

## 2018-04-22 PROCEDURE — 0PSLXZZ REPOSITION LEFT ULNA, EXTERNAL APPROACH: ICD-10-PCS | Performed by: ORTHOPAEDIC SURGERY

## 2018-04-22 PROCEDURE — 25010000002 ONDANSETRON PER 1 MG: Performed by: NURSE ANESTHETIST, CERTIFIED REGISTERED

## 2018-04-22 PROCEDURE — 25010000002 PROPOFOL 10 MG/ML EMULSION: Performed by: NURSE ANESTHETIST, CERTIFIED REGISTERED

## 2018-04-22 PROCEDURE — 83605 ASSAY OF LACTIC ACID: CPT | Performed by: HOSPITALIST

## 2018-04-22 PROCEDURE — 80053 COMPREHEN METABOLIC PANEL: CPT | Performed by: HOSPITALIST

## 2018-04-22 PROCEDURE — 25010000002 MORPHINE PER 10 MG: Performed by: HOSPITALIST

## 2018-04-22 PROCEDURE — 25010000002 DEXAMETHASONE PER 1 MG: Performed by: NURSE ANESTHETIST, CERTIFIED REGISTERED

## 2018-04-22 PROCEDURE — 99222 1ST HOSP IP/OBS MODERATE 55: CPT | Performed by: INTERNAL MEDICINE

## 2018-04-22 PROCEDURE — 73100 X-RAY EXAM OF WRIST: CPT

## 2018-04-22 PROCEDURE — 0PSJXZZ REPOSITION LEFT RADIUS, EXTERNAL APPROACH: ICD-10-PCS | Performed by: ORTHOPAEDIC SURGERY

## 2018-04-22 DEVICE — TRIGEN INTERTAN SUBTROCHANTERIC                                    LAG SCREW 11MM X 100MM
Type: IMPLANTABLE DEVICE | Status: FUNCTIONAL
Brand: TRIGEN

## 2018-04-22 DEVICE — TRIGEN LOW PROFILE SCREW 5.0MM X 37.5MM
Type: IMPLANTABLE DEVICE | Status: FUNCTIONAL
Brand: TRIGEN

## 2018-04-22 DEVICE — TRIGEN INTERTAN 1.5 11.5MM X 38CM                                    130DEGREE LEFT
Type: IMPLANTABLE DEVICE | Status: FUNCTIONAL
Brand: TRIGEN

## 2018-04-22 RX ORDER — EPHEDRINE SULFATE 50 MG/ML
INJECTION, SOLUTION INTRAVENOUS AS NEEDED
Status: DISCONTINUED | OUTPATIENT
Start: 2018-04-22 | End: 2018-04-22 | Stop reason: SURG

## 2018-04-22 RX ORDER — PROMETHAZINE HYDROCHLORIDE 25 MG/ML
12.5 INJECTION, SOLUTION INTRAMUSCULAR; INTRAVENOUS ONCE AS NEEDED
Status: DISCONTINUED | OUTPATIENT
Start: 2018-04-22 | End: 2018-04-22 | Stop reason: HOSPADM

## 2018-04-22 RX ORDER — ONDANSETRON 2 MG/ML
INJECTION INTRAMUSCULAR; INTRAVENOUS AS NEEDED
Status: DISCONTINUED | OUTPATIENT
Start: 2018-04-22 | End: 2018-04-22 | Stop reason: SURG

## 2018-04-22 RX ORDER — LABETALOL HYDROCHLORIDE 5 MG/ML
5 INJECTION, SOLUTION INTRAVENOUS
Status: DISCONTINUED | OUTPATIENT
Start: 2018-04-22 | End: 2018-04-22 | Stop reason: HOSPADM

## 2018-04-22 RX ORDER — SODIUM CHLORIDE, SODIUM LACTATE, POTASSIUM CHLORIDE, CALCIUM CHLORIDE 600; 310; 30; 20 MG/100ML; MG/100ML; MG/100ML; MG/100ML
9 INJECTION, SOLUTION INTRAVENOUS CONTINUOUS
Status: DISCONTINUED | OUTPATIENT
Start: 2018-04-22 | End: 2018-04-24

## 2018-04-22 RX ORDER — CEFAZOLIN SODIUM 2 G/100ML
2 INJECTION, SOLUTION INTRAVENOUS EVERY 8 HOURS
Status: COMPLETED | OUTPATIENT
Start: 2018-04-22 | End: 2018-04-23

## 2018-04-22 RX ORDER — EPHEDRINE SULFATE 50 MG/ML
5 INJECTION, SOLUTION INTRAVENOUS ONCE AS NEEDED
Status: DISCONTINUED | OUTPATIENT
Start: 2018-04-22 | End: 2018-04-22 | Stop reason: HOSPADM

## 2018-04-22 RX ORDER — MIDAZOLAM HYDROCHLORIDE 1 MG/ML
2 INJECTION INTRAMUSCULAR; INTRAVENOUS
Status: DISCONTINUED | OUTPATIENT
Start: 2018-04-22 | End: 2018-04-22 | Stop reason: HOSPADM

## 2018-04-22 RX ORDER — FENTANYL CITRATE 50 UG/ML
INJECTION, SOLUTION INTRAMUSCULAR; INTRAVENOUS AS NEEDED
Status: DISCONTINUED | OUTPATIENT
Start: 2018-04-22 | End: 2018-04-22 | Stop reason: SURG

## 2018-04-22 RX ORDER — LIDOCAINE HYDROCHLORIDE 10 MG/ML
0.5 INJECTION, SOLUTION EPIDURAL; INFILTRATION; INTRACAUDAL; PERINEURAL ONCE AS NEEDED
Status: DISCONTINUED | OUTPATIENT
Start: 2018-04-22 | End: 2018-04-22 | Stop reason: HOSPADM

## 2018-04-22 RX ORDER — PROMETHAZINE HYDROCHLORIDE 25 MG/1
12.5 TABLET ORAL ONCE AS NEEDED
Status: DISCONTINUED | OUTPATIENT
Start: 2018-04-22 | End: 2018-04-22 | Stop reason: HOSPADM

## 2018-04-22 RX ORDER — OXYCODONE AND ACETAMINOPHEN 7.5; 325 MG/1; MG/1
1 TABLET ORAL ONCE AS NEEDED
Status: DISCONTINUED | OUTPATIENT
Start: 2018-04-22 | End: 2018-04-22 | Stop reason: HOSPADM

## 2018-04-22 RX ORDER — SODIUM CHLORIDE 0.9 % (FLUSH) 0.9 %
1-10 SYRINGE (ML) INJECTION AS NEEDED
Status: DISCONTINUED | OUTPATIENT
Start: 2018-04-22 | End: 2018-04-22 | Stop reason: HOSPADM

## 2018-04-22 RX ORDER — NALOXONE HCL 0.4 MG/ML
0.2 VIAL (ML) INJECTION AS NEEDED
Status: DISCONTINUED | OUTPATIENT
Start: 2018-04-22 | End: 2018-04-22 | Stop reason: HOSPADM

## 2018-04-22 RX ORDER — FLUMAZENIL 0.1 MG/ML
0.2 INJECTION INTRAVENOUS AS NEEDED
Status: DISCONTINUED | OUTPATIENT
Start: 2018-04-22 | End: 2018-04-22 | Stop reason: HOSPADM

## 2018-04-22 RX ORDER — FENTANYL CITRATE 50 UG/ML
INJECTION, SOLUTION INTRAMUSCULAR; INTRAVENOUS
Status: COMPLETED
Start: 2018-04-22 | End: 2018-04-22

## 2018-04-22 RX ORDER — FAMOTIDINE 10 MG/ML
20 INJECTION, SOLUTION INTRAVENOUS ONCE
Status: COMPLETED | OUTPATIENT
Start: 2018-04-22 | End: 2018-04-22

## 2018-04-22 RX ORDER — CEFAZOLIN SODIUM 2 G/100ML
2 INJECTION, SOLUTION INTRAVENOUS ONCE
Status: COMPLETED | OUTPATIENT
Start: 2018-04-22 | End: 2018-04-22

## 2018-04-22 RX ORDER — DIPHENHYDRAMINE HYDROCHLORIDE 50 MG/ML
12.5 INJECTION INTRAMUSCULAR; INTRAVENOUS
Status: DISCONTINUED | OUTPATIENT
Start: 2018-04-22 | End: 2018-04-22 | Stop reason: HOSPADM

## 2018-04-22 RX ORDER — HYDROCODONE BITARTRATE AND ACETAMINOPHEN 7.5; 325 MG/1; MG/1
1 TABLET ORAL ONCE AS NEEDED
Status: DISCONTINUED | OUTPATIENT
Start: 2018-04-22 | End: 2018-04-22 | Stop reason: HOSPADM

## 2018-04-22 RX ORDER — LIDOCAINE HYDROCHLORIDE 20 MG/ML
INJECTION, SOLUTION INFILTRATION; PERINEURAL AS NEEDED
Status: DISCONTINUED | OUTPATIENT
Start: 2018-04-22 | End: 2018-04-22 | Stop reason: SURG

## 2018-04-22 RX ORDER — DEXAMETHASONE SODIUM PHOSPHATE 10 MG/ML
INJECTION INTRAMUSCULAR; INTRAVENOUS AS NEEDED
Status: DISCONTINUED | OUTPATIENT
Start: 2018-04-22 | End: 2018-04-22 | Stop reason: SURG

## 2018-04-22 RX ORDER — FENTANYL CITRATE 50 UG/ML
50 INJECTION, SOLUTION INTRAMUSCULAR; INTRAVENOUS
Status: DISCONTINUED | OUTPATIENT
Start: 2018-04-22 | End: 2018-04-22 | Stop reason: HOSPADM

## 2018-04-22 RX ORDER — MAGNESIUM HYDROXIDE 1200 MG/15ML
LIQUID ORAL AS NEEDED
Status: DISCONTINUED | OUTPATIENT
Start: 2018-04-22 | End: 2018-04-22 | Stop reason: HOSPADM

## 2018-04-22 RX ORDER — PROMETHAZINE HYDROCHLORIDE 25 MG/1
25 TABLET ORAL ONCE AS NEEDED
Status: DISCONTINUED | OUTPATIENT
Start: 2018-04-22 | End: 2018-04-22 | Stop reason: HOSPADM

## 2018-04-22 RX ORDER — MIDAZOLAM HYDROCHLORIDE 1 MG/ML
1 INJECTION INTRAMUSCULAR; INTRAVENOUS
Status: DISCONTINUED | OUTPATIENT
Start: 2018-04-22 | End: 2018-04-22 | Stop reason: HOSPADM

## 2018-04-22 RX ORDER — PROPOFOL 10 MG/ML
VIAL (ML) INTRAVENOUS AS NEEDED
Status: DISCONTINUED | OUTPATIENT
Start: 2018-04-22 | End: 2018-04-22 | Stop reason: SURG

## 2018-04-22 RX ORDER — HYDROMORPHONE HCL 110MG/55ML
0.5 PATIENT CONTROLLED ANALGESIA SYRINGE INTRAVENOUS
Status: DISCONTINUED | OUTPATIENT
Start: 2018-04-22 | End: 2018-04-22 | Stop reason: HOSPADM

## 2018-04-22 RX ORDER — HYDRALAZINE HYDROCHLORIDE 20 MG/ML
5 INJECTION INTRAMUSCULAR; INTRAVENOUS
Status: DISCONTINUED | OUTPATIENT
Start: 2018-04-22 | End: 2018-04-22 | Stop reason: HOSPADM

## 2018-04-22 RX ORDER — ONDANSETRON 2 MG/ML
4 INJECTION INTRAMUSCULAR; INTRAVENOUS ONCE AS NEEDED
Status: DISCONTINUED | OUTPATIENT
Start: 2018-04-22 | End: 2018-04-22 | Stop reason: HOSPADM

## 2018-04-22 RX ORDER — PROMETHAZINE HYDROCHLORIDE 25 MG/1
25 SUPPOSITORY RECTAL ONCE AS NEEDED
Status: DISCONTINUED | OUTPATIENT
Start: 2018-04-22 | End: 2018-04-22 | Stop reason: HOSPADM

## 2018-04-22 RX ADMIN — METOPROLOL TARTRATE 50 MG: 50 TABLET, FILM COATED ORAL at 08:53

## 2018-04-22 RX ADMIN — CEFAZOLIN SODIUM 2 G: 2 INJECTION, SOLUTION INTRAVENOUS at 11:02

## 2018-04-22 RX ADMIN — EPHEDRINE SULFATE 2.5 MG: 50 INJECTION INTRAMUSCULAR; INTRAVENOUS; SUBCUTANEOUS at 11:09

## 2018-04-22 RX ADMIN — MORPHINE SULFATE 1 MG: 2 INJECTION, SOLUTION INTRAMUSCULAR; INTRAVENOUS at 16:51

## 2018-04-22 RX ADMIN — PROPOFOL 50 MG: 10 INJECTION, EMULSION INTRAVENOUS at 11:02

## 2018-04-22 RX ADMIN — FENTANYL CITRATE 25 MCG: 50 INJECTION INTRAMUSCULAR; INTRAVENOUS at 12:32

## 2018-04-22 RX ADMIN — FENTANYL CITRATE 12.5 MCG: 50 INJECTION, SOLUTION INTRAMUSCULAR; INTRAVENOUS at 09:37

## 2018-04-22 RX ADMIN — FENTANYL CITRATE 25 MCG: 50 INJECTION INTRAMUSCULAR; INTRAVENOUS at 12:06

## 2018-04-22 RX ADMIN — FENTANYL CITRATE 50 MCG: 50 INJECTION, SOLUTION INTRAMUSCULAR; INTRAVENOUS at 14:14

## 2018-04-22 RX ADMIN — SODIUM CHLORIDE, POTASSIUM CHLORIDE, SODIUM LACTATE AND CALCIUM CHLORIDE 75 ML/HR: 600; 310; 30; 20 INJECTION, SOLUTION INTRAVENOUS at 23:34

## 2018-04-22 RX ADMIN — FAMOTIDINE 20 MG: 10 INJECTION INTRAVENOUS at 09:37

## 2018-04-22 RX ADMIN — ONDANSETRON 4 MG: 2 INJECTION INTRAMUSCULAR; INTRAVENOUS at 12:50

## 2018-04-22 RX ADMIN — ASPIRIN 81 MG: 81 TABLET ORAL at 17:46

## 2018-04-22 RX ADMIN — FENTANYL CITRATE 25 MCG: 50 INJECTION INTRAMUSCULAR; INTRAVENOUS at 11:07

## 2018-04-22 RX ADMIN — CEFAZOLIN SODIUM 2 G: 2 INJECTION, SOLUTION INTRAVENOUS at 19:40

## 2018-04-22 RX ADMIN — DEXAMETHASONE SODIUM PHOSPHATE 8 MG: 10 INJECTION INTRAMUSCULAR; INTRAVENOUS at 11:10

## 2018-04-22 RX ADMIN — EPHEDRINE SULFATE 7.5 MG: 50 INJECTION INTRAMUSCULAR; INTRAVENOUS; SUBCUTANEOUS at 11:15

## 2018-04-22 RX ADMIN — SODIUM CHLORIDE, POTASSIUM CHLORIDE, SODIUM LACTATE AND CALCIUM CHLORIDE: 600; 310; 30; 20 INJECTION, SOLUTION INTRAVENOUS at 10:57

## 2018-04-22 RX ADMIN — FENTANYL CITRATE 25 MCG: 50 INJECTION INTRAMUSCULAR; INTRAVENOUS at 11:27

## 2018-04-22 RX ADMIN — FENTANYL CITRATE 25 MCG: 50 INJECTION INTRAMUSCULAR; INTRAVENOUS at 11:47

## 2018-04-22 RX ADMIN — BUSPIRONE HYDROCHLORIDE 15 MG: 15 TABLET ORAL at 20:05

## 2018-04-22 RX ADMIN — FENTANYL CITRATE 50 MCG: 50 INJECTION, SOLUTION INTRAMUSCULAR; INTRAVENOUS at 13:42

## 2018-04-22 RX ADMIN — LIDOCAINE HYDROCHLORIDE 60 MG: 20 INJECTION, SOLUTION INFILTRATION; PERINEURAL at 11:02

## 2018-04-22 RX ADMIN — ENOXAPARIN SODIUM 40 MG: 40 INJECTION SUBCUTANEOUS at 17:46

## 2018-04-22 NOTE — ANESTHESIA POSTPROCEDURE EVALUATION
Patient: Cecelia GODINEZ Block    Procedure Summary     Date:  04/22/18 Room / Location:  [facility] OR [address] [place] [address] MAIN OR    Anesthesia Start:  1057 Anesthesia Stop:  1317    Procedure:  LEFT INTRAMEDULLARY NAILING OF LEFT HIP FRACTURE, CLOSED REDUCTION INTERNAL FIXATION OF LEFT WRIST (Left Thigh) Diagnosis:       Closed fracture of left hip, initial encounter      (Closed fracture of left hip, initial encounter [S72.002A])    Surgeon:  Juan Ricks Jr., MD Provider:  Cass Claire MD    Anesthesia Type:  regional ASA Status:  3          Anesthesia Type: regional  Last vitals  BP   (!) 187/80 (04/22/18 1315)   Temp   36.6 °C (97.8 °F) (04/22/18 1315)   Pulse   60 (04/22/18 1315)   Resp   14 (04/22/18 1315)     SpO2   100 % (04/22/18 1315)     Post Anesthesia Care and Evaluation    Patient location during evaluation: PACU  Patient participation: complete - patient participated  Level of consciousness: awake  Pain management: adequate  Airway patency: patent  Anesthetic complications: No anesthetic complications    Cardiovascular status: acceptable  Respiratory status: acceptable  Hydration status: acceptable

## 2018-04-22 NOTE — ANESTHESIA PREPROCEDURE EVALUATION
Anesthesia Evaluation     Patient summary reviewed and Nursing notes reviewed   no history of anesthetic complications:  NPO Solid Status: > 8 hours             Airway   Mallampati: III  TM distance: >3 FB  Neck ROM: limited  no difficulty expected  Dental - normal exam   (+) poor dentition    Pulmonary - negative pulmonary ROS and normal exam   Cardiovascular - normal exam  Exercise tolerance: good (4-7 METS)    ECG reviewed    (+) pacemaker, hypertension, valvular problems/murmurs, dysrhythmias, CHF, hyperlipidemia,     ROS comment: RBBB, af/aflutter w/ complete a-v block, no change prior    Neuro/Psych  (+) psychiatric history Anxiety and Depression,     GI/Hepatic/Renal/Endo    (+)  GERD, GI bleeding, renal disease CRI,     Musculoskeletal     Abdominal  - normal exam    Bowel sounds: normal.   Substance History - negative use     OB/GYN negative ob/gyn ROS         Other   (+) arthritis                   Anesthesia Plan    ASA 3     general   (No regional per surgeon request)  Anesthetic plan and risks discussed with patient.

## 2018-04-22 NOTE — ANESTHESIA PROCEDURE NOTES
Airway  Urgency: elective    Airway not difficult    General Information and Staff    Patient location during procedure: OR  Anesthesiologist: ALICJA BLANDON  CRNA: JASMYNE ANDRADE    Indications and Patient Condition  Indications for airway management: airway protection    Preoxygenated: yes  Mask difficulty assessment: 0 - not attempted    Final Airway Details  Final airway type: supraglottic airway      Successful airway: unique  Size 3    Number of attempts at approach: 1    Additional Comments  Atraumatic.  Dentition as preop.

## 2018-04-23 ENCOUNTER — APPOINTMENT (OUTPATIENT)
Dept: GENERAL RADIOLOGY | Facility: HOSPITAL | Age: 83
End: 2018-04-23
Attending: ORTHOPAEDIC SURGERY

## 2018-04-23 LAB
ANION GAP SERPL CALCULATED.3IONS-SCNC: 13.2 MMOL/L
BUN BLD-MCNC: 31 MG/DL (ref 8–23)
BUN/CREAT SERPL: 26.5 (ref 7–25)
CALCIUM SPEC-SCNC: 9 MG/DL (ref 8.2–9.6)
CHLORIDE SERPL-SCNC: 94 MMOL/L (ref 98–107)
CO2 SERPL-SCNC: 24.8 MMOL/L (ref 22–29)
CREAT BLD-MCNC: 1.17 MG/DL (ref 0.57–1)
DEPRECATED RDW RBC AUTO: 47.8 FL (ref 37–54)
ERYTHROCYTE [DISTWIDTH] IN BLOOD BY AUTOMATED COUNT: 14.1 % (ref 11.7–13)
GFR SERPL CREATININE-BSD FRML MDRD: 43 ML/MIN/1.73
GLUCOSE BLD-MCNC: 131 MG/DL (ref 65–99)
HCT VFR BLD AUTO: 28.3 % (ref 35.6–45.5)
HGB BLD-MCNC: 9 G/DL (ref 11.9–15.5)
LYMPHOCYTES # BLD MANUAL: 1.76 10*3/MM3 (ref 0.9–4.8)
LYMPHOCYTES NFR BLD MANUAL: 11 % (ref 19.6–45.3)
LYMPHOCYTES NFR BLD MANUAL: 3 % (ref 5–12)
MCH RBC QN AUTO: 29.8 PG (ref 26.9–32)
MCHC RBC AUTO-ENTMCNC: 31.8 G/DL (ref 32.4–36.3)
MCV RBC AUTO: 93.7 FL (ref 80.5–98.2)
MONOCYTES # BLD AUTO: 0.48 10*3/MM3 (ref 0.2–1.2)
NEUTROPHILS # BLD AUTO: 13.59 10*3/MM3 (ref 1.9–8.1)
NEUTROPHILS NFR BLD MANUAL: 85 % (ref 42.7–76)
PLAT MORPH BLD: NORMAL
PLATELET # BLD AUTO: 108 10*3/MM3 (ref 140–500)
PMV BLD AUTO: 13.3 FL (ref 6–12)
POTASSIUM BLD-SCNC: 4.8 MMOL/L (ref 3.5–5.2)
RBC # BLD AUTO: 3.02 10*6/MM3 (ref 3.9–5.2)
RBC MORPH BLD: NORMAL
SCAN SLIDE: NORMAL
SODIUM BLD-SCNC: 132 MMOL/L (ref 136–145)
VARIANT LYMPHS NFR BLD MANUAL: 1 % (ref 0–5)
WBC MORPH BLD: NORMAL
WBC NRBC COR # BLD: 15.99 10*3/MM3 (ref 4.5–10.7)

## 2018-04-23 PROCEDURE — 25010000002 ENOXAPARIN PER 10 MG: Performed by: ORTHOPAEDIC SURGERY

## 2018-04-23 PROCEDURE — 85007 BL SMEAR W/DIFF WBC COUNT: CPT | Performed by: HOSPITALIST

## 2018-04-23 PROCEDURE — 85025 COMPLETE CBC W/AUTO DIFF WBC: CPT | Performed by: HOSPITALIST

## 2018-04-23 PROCEDURE — 97162 PT EVAL MOD COMPLEX 30 MIN: CPT

## 2018-04-23 PROCEDURE — 80048 BASIC METABOLIC PNL TOTAL CA: CPT | Performed by: HOSPITALIST

## 2018-04-23 PROCEDURE — 73110 X-RAY EXAM OF WRIST: CPT

## 2018-04-23 PROCEDURE — 97110 THERAPEUTIC EXERCISES: CPT

## 2018-04-23 PROCEDURE — 73502 X-RAY EXAM HIP UNI 2-3 VIEWS: CPT

## 2018-04-23 PROCEDURE — 25010000002 MORPHINE PER 10 MG: Performed by: HOSPITALIST

## 2018-04-23 PROCEDURE — 25010000003 CEFAZOLIN IN DEXTROSE 2-4 GM/100ML-% SOLUTION: Performed by: ORTHOPAEDIC SURGERY

## 2018-04-23 PROCEDURE — 99232 SBSQ HOSP IP/OBS MODERATE 35: CPT | Performed by: INTERNAL MEDICINE

## 2018-04-23 RX ADMIN — BUSPIRONE HYDROCHLORIDE 15 MG: 15 TABLET ORAL at 21:37

## 2018-04-23 RX ADMIN — METOPROLOL TARTRATE 50 MG: 50 TABLET, FILM COATED ORAL at 10:56

## 2018-04-23 RX ADMIN — BUSPIRONE HYDROCHLORIDE 15 MG: 15 TABLET ORAL at 10:57

## 2018-04-23 RX ADMIN — HYDROCODONE BITARTRATE AND ACETAMINOPHEN 1 TABLET: 5; 325 TABLET ORAL at 18:57

## 2018-04-23 RX ADMIN — MORPHINE SULFATE 1 MG: 2 INJECTION, SOLUTION INTRAMUSCULAR; INTRAVENOUS at 08:31

## 2018-04-23 RX ADMIN — ASPIRIN 81 MG: 81 TABLET ORAL at 10:56

## 2018-04-23 RX ADMIN — FLUTICASONE PROPIONATE 2 SPRAY: 50 SPRAY, METERED NASAL at 10:57

## 2018-04-23 RX ADMIN — CEFAZOLIN SODIUM 2 G: 2 INJECTION, SOLUTION INTRAVENOUS at 02:41

## 2018-04-23 RX ADMIN — CEFAZOLIN SODIUM 2 G: 2 INJECTION, SOLUTION INTRAVENOUS at 10:57

## 2018-04-23 RX ADMIN — ENOXAPARIN SODIUM 30 MG: 30 INJECTION SUBCUTANEOUS at 16:30

## 2018-04-23 RX ADMIN — HYDROCODONE BITARTRATE AND ACETAMINOPHEN 1 TABLET: 5; 325 TABLET ORAL at 10:56

## 2018-04-23 RX ADMIN — METOPROLOL TARTRATE 50 MG: 50 TABLET, FILM COATED ORAL at 21:37

## 2018-04-24 LAB
ALBUMIN SERPL-MCNC: 3 G/DL (ref 3.5–5.2)
ALBUMIN/GLOB SERPL: 1.2 G/DL
ALP SERPL-CCNC: 56 U/L (ref 39–117)
ALT SERPL W P-5'-P-CCNC: 5 U/L (ref 1–33)
ANION GAP SERPL CALCULATED.3IONS-SCNC: 9.7 MMOL/L
AST SERPL-CCNC: 24 U/L (ref 1–32)
BASOPHILS # BLD AUTO: 0.01 10*3/MM3 (ref 0–0.2)
BASOPHILS NFR BLD AUTO: 0.1 % (ref 0–1.5)
BILIRUB SERPL-MCNC: 0.7 MG/DL (ref 0.1–1.2)
BUN BLD-MCNC: 25 MG/DL (ref 8–23)
BUN/CREAT SERPL: 26.9 (ref 7–25)
CALCIUM SPEC-SCNC: 9.1 MG/DL (ref 8.2–9.6)
CHLORIDE SERPL-SCNC: 95 MMOL/L (ref 98–107)
CO2 SERPL-SCNC: 28.3 MMOL/L (ref 22–29)
CREAT BLD-MCNC: 0.93 MG/DL (ref 0.57–1)
DEPRECATED RDW RBC AUTO: 48.3 FL (ref 37–54)
EOSINOPHIL # BLD AUTO: 0.06 10*3/MM3 (ref 0–0.7)
EOSINOPHIL NFR BLD AUTO: 0.5 % (ref 0.3–6.2)
ERYTHROCYTE [DISTWIDTH] IN BLOOD BY AUTOMATED COUNT: 14.2 % (ref 11.7–13)
GFR SERPL CREATININE-BSD FRML MDRD: 57 ML/MIN/1.73
GLOBULIN UR ELPH-MCNC: 2.6 GM/DL
GLUCOSE BLD-MCNC: 106 MG/DL (ref 65–99)
GLUCOSE BLDC GLUCOMTR-MCNC: 111 MG/DL (ref 70–130)
HCT VFR BLD AUTO: 25.4 % (ref 35.6–45.5)
HGB BLD-MCNC: 8.3 G/DL (ref 11.9–15.5)
IMM GRANULOCYTES # BLD: 0.02 10*3/MM3 (ref 0–0.03)
IMM GRANULOCYTES NFR BLD: 0.2 % (ref 0–0.5)
LYMPHOCYTES # BLD AUTO: 1.6 10*3/MM3 (ref 0.9–4.8)
LYMPHOCYTES NFR BLD AUTO: 13.5 % (ref 19.6–45.3)
MCH RBC QN AUTO: 30.6 PG (ref 26.9–32)
MCHC RBC AUTO-ENTMCNC: 32.7 G/DL (ref 32.4–36.3)
MCV RBC AUTO: 93.7 FL (ref 80.5–98.2)
MONOCYTES # BLD AUTO: 1.74 10*3/MM3 (ref 0.2–1.2)
MONOCYTES NFR BLD AUTO: 14.7 % (ref 5–12)
NEUTROPHILS # BLD AUTO: 8.43 10*3/MM3 (ref 1.9–8.1)
NEUTROPHILS NFR BLD AUTO: 71.2 % (ref 42.7–76)
PLATELET # BLD AUTO: 118 10*3/MM3 (ref 140–500)
PMV BLD AUTO: 12.2 FL (ref 6–12)
POTASSIUM BLD-SCNC: 4.6 MMOL/L (ref 3.5–5.2)
PROT SERPL-MCNC: 5.6 G/DL (ref 6–8.5)
RBC # BLD AUTO: 2.71 10*6/MM3 (ref 3.9–5.2)
SODIUM BLD-SCNC: 133 MMOL/L (ref 136–145)
WBC NRBC COR # BLD: 11.84 10*3/MM3 (ref 4.5–10.7)

## 2018-04-24 PROCEDURE — 97110 THERAPEUTIC EXERCISES: CPT

## 2018-04-24 PROCEDURE — 80053 COMPREHEN METABOLIC PANEL: CPT | Performed by: HOSPITALIST

## 2018-04-24 PROCEDURE — 25010000002 ENOXAPARIN PER 10 MG: Performed by: ORTHOPAEDIC SURGERY

## 2018-04-24 PROCEDURE — 82962 GLUCOSE BLOOD TEST: CPT

## 2018-04-24 PROCEDURE — 85025 COMPLETE CBC W/AUTO DIFF WBC: CPT | Performed by: HOSPITALIST

## 2018-04-24 RX ADMIN — HYDROCODONE BITARTRATE AND ACETAMINOPHEN 1 TABLET: 5; 325 TABLET ORAL at 22:15

## 2018-04-24 RX ADMIN — BUSPIRONE HYDROCHLORIDE 15 MG: 15 TABLET ORAL at 22:10

## 2018-04-24 RX ADMIN — HYDROCODONE BITARTRATE AND ACETAMINOPHEN 1 TABLET: 5; 325 TABLET ORAL at 14:19

## 2018-04-24 RX ADMIN — PANTOPRAZOLE SODIUM 40 MG: 40 TABLET, DELAYED RELEASE ORAL at 08:27

## 2018-04-24 RX ADMIN — ASPIRIN 81 MG: 81 TABLET ORAL at 08:26

## 2018-04-24 RX ADMIN — BUSPIRONE HYDROCHLORIDE 15 MG: 15 TABLET ORAL at 08:27

## 2018-04-24 RX ADMIN — METOPROLOL TARTRATE 50 MG: 50 TABLET, FILM COATED ORAL at 08:26

## 2018-04-24 RX ADMIN — HYDROCODONE BITARTRATE AND ACETAMINOPHEN 1 TABLET: 5; 325 TABLET ORAL at 08:27

## 2018-04-24 RX ADMIN — ENOXAPARIN SODIUM 30 MG: 30 INJECTION SUBCUTANEOUS at 18:16

## 2018-04-24 RX ADMIN — FLUTICASONE PROPIONATE 2 SPRAY: 50 SPRAY, METERED NASAL at 08:27

## 2018-04-24 RX ADMIN — POLYETHYLENE GLYCOL 3350 17 G: 17 POWDER, FOR SOLUTION ORAL at 18:16

## 2018-04-24 RX ADMIN — METOPROLOL TARTRATE 50 MG: 50 TABLET, FILM COATED ORAL at 22:13

## 2018-04-25 PROBLEM — I70.235 ATHEROSCLEROSIS OF NATIVE ARTERIES OF RIGHT LEG WITH ULCERATION OF OTHER PART OF FOOT (HCC): Status: ACTIVE | Noted: 2018-04-25

## 2018-04-25 LAB
ALBUMIN SERPL-MCNC: 3 G/DL (ref 3.5–5.2)
ALBUMIN/GLOB SERPL: 1.2 G/DL
ALP SERPL-CCNC: 60 U/L (ref 39–117)
ALT SERPL W P-5'-P-CCNC: <5 U/L (ref 1–33)
ANION GAP SERPL CALCULATED.3IONS-SCNC: 7.3 MMOL/L
AST SERPL-CCNC: 23 U/L (ref 1–32)
BASOPHILS # BLD AUTO: 0.01 10*3/MM3 (ref 0–0.2)
BASOPHILS NFR BLD AUTO: 0.1 % (ref 0–1.5)
BILIRUB SERPL-MCNC: 0.8 MG/DL (ref 0.1–1.2)
BUN BLD-MCNC: 22 MG/DL (ref 8–23)
BUN/CREAT SERPL: 28.9 (ref 7–25)
CALCIUM SPEC-SCNC: 8.9 MG/DL (ref 8.2–9.6)
CHLORIDE SERPL-SCNC: 94 MMOL/L (ref 98–107)
CO2 SERPL-SCNC: 29.7 MMOL/L (ref 22–29)
CREAT BLD-MCNC: 0.76 MG/DL (ref 0.57–1)
DEPRECATED RDW RBC AUTO: 48.5 FL (ref 37–54)
EOSINOPHIL # BLD AUTO: 0.2 10*3/MM3 (ref 0–0.7)
EOSINOPHIL NFR BLD AUTO: 1.8 % (ref 0.3–6.2)
ERYTHROCYTE [DISTWIDTH] IN BLOOD BY AUTOMATED COUNT: 14 % (ref 11.7–13)
GFR SERPL CREATININE-BSD FRML MDRD: 71 ML/MIN/1.73
GLOBULIN UR ELPH-MCNC: 2.6 GM/DL
GLUCOSE BLD-MCNC: 134 MG/DL (ref 65–99)
HCT VFR BLD AUTO: 25.2 % (ref 35.6–45.5)
HGB BLD-MCNC: 7.9 G/DL (ref 11.9–15.5)
IMM GRANULOCYTES # BLD: 0.04 10*3/MM3 (ref 0–0.03)
IMM GRANULOCYTES NFR BLD: 0.4 % (ref 0–0.5)
LYMPHOCYTES # BLD AUTO: 1.29 10*3/MM3 (ref 0.9–4.8)
LYMPHOCYTES NFR BLD AUTO: 11.6 % (ref 19.6–45.3)
MCH RBC QN AUTO: 30.2 PG (ref 26.9–32)
MCHC RBC AUTO-ENTMCNC: 31.3 G/DL (ref 32.4–36.3)
MCV RBC AUTO: 96.2 FL (ref 80.5–98.2)
MONOCYTES # BLD AUTO: 1.63 10*3/MM3 (ref 0.2–1.2)
MONOCYTES NFR BLD AUTO: 14.7 % (ref 5–12)
NEUTROPHILS # BLD AUTO: 7.94 10*3/MM3 (ref 1.9–8.1)
NEUTROPHILS NFR BLD AUTO: 71.4 % (ref 42.7–76)
PLATELET # BLD AUTO: 162 10*3/MM3 (ref 140–500)
PMV BLD AUTO: 11.9 FL (ref 6–12)
POTASSIUM BLD-SCNC: 4.4 MMOL/L (ref 3.5–5.2)
PROT SERPL-MCNC: 5.6 G/DL (ref 6–8.5)
RBC # BLD AUTO: 2.62 10*6/MM3 (ref 3.9–5.2)
SODIUM BLD-SCNC: 131 MMOL/L (ref 136–145)
WBC NRBC COR # BLD: 11.11 10*3/MM3 (ref 4.5–10.7)

## 2018-04-25 PROCEDURE — 80053 COMPREHEN METABOLIC PANEL: CPT | Performed by: HOSPITALIST

## 2018-04-25 PROCEDURE — 97110 THERAPEUTIC EXERCISES: CPT

## 2018-04-25 PROCEDURE — 85025 COMPLETE CBC W/AUTO DIFF WBC: CPT | Performed by: HOSPITALIST

## 2018-04-25 RX ORDER — CLOPIDOGREL BISULFATE 75 MG/1
75 TABLET ORAL DAILY
Status: DISCONTINUED | OUTPATIENT
Start: 2018-04-26 | End: 2018-04-25

## 2018-04-25 RX ORDER — CLOPIDOGREL BISULFATE 75 MG/1
75 TABLET ORAL DAILY
Status: DISCONTINUED | OUTPATIENT
Start: 2018-04-25 | End: 2018-04-27 | Stop reason: HOSPADM

## 2018-04-25 RX ORDER — DOCUSATE SODIUM 100 MG/1
100 CAPSULE, LIQUID FILLED ORAL 2 TIMES DAILY
Status: DISCONTINUED | OUTPATIENT
Start: 2018-04-25 | End: 2018-04-27 | Stop reason: HOSPADM

## 2018-04-25 RX ORDER — HYDROCODONE BITARTRATE AND ACETAMINOPHEN 5; 325 MG/1; MG/1
1 TABLET ORAL EVERY 4 HOURS PRN
Qty: 20 TABLET | Refills: 0 | Status: SHIPPED | OUTPATIENT
Start: 2018-04-25 | End: 2018-04-27

## 2018-04-25 RX ORDER — PSEUDOEPHEDRINE HCL 30 MG
100 TABLET ORAL 2 TIMES DAILY
Start: 2018-04-25

## 2018-04-25 RX ORDER — BACLOFEN 10 MG/1
5 TABLET ORAL ONCE
Status: COMPLETED | OUTPATIENT
Start: 2018-04-25 | End: 2018-04-25

## 2018-04-25 RX ORDER — ACETAMINOPHEN 325 MG/1
650 TABLET ORAL EVERY 4 HOURS PRN
Start: 2018-04-25

## 2018-04-25 RX ORDER — CLOPIDOGREL BISULFATE 75 MG/1
75 TABLET ORAL DAILY
Qty: 30 TABLET | Refills: 0 | Status: SHIPPED | OUTPATIENT
Start: 2018-05-02 | End: 2018-04-25

## 2018-04-25 RX ORDER — CLOPIDOGREL BISULFATE 75 MG/1
75 TABLET ORAL DAILY
Qty: 30 TABLET | Refills: 0
Start: 2018-05-02 | End: 2018-04-27

## 2018-04-25 RX ADMIN — METOPROLOL TARTRATE 50 MG: 50 TABLET, FILM COATED ORAL at 20:06

## 2018-04-25 RX ADMIN — POLYETHYLENE GLYCOL 3350 17 G: 17 POWDER, FOR SOLUTION ORAL at 20:06

## 2018-04-25 RX ADMIN — PANTOPRAZOLE SODIUM 40 MG: 40 TABLET, DELAYED RELEASE ORAL at 06:35

## 2018-04-25 RX ADMIN — CLOPIDOGREL 75 MG: 75 TABLET, FILM COATED ORAL at 18:40

## 2018-04-25 RX ADMIN — FLUTICASONE PROPIONATE 2 SPRAY: 50 SPRAY, METERED NASAL at 09:11

## 2018-04-25 RX ADMIN — BACLOFEN 5 MG: 10 TABLET ORAL at 23:25

## 2018-04-25 RX ADMIN — BUSPIRONE HYDROCHLORIDE 15 MG: 15 TABLET ORAL at 09:11

## 2018-04-25 RX ADMIN — DOCUSATE SODIUM 100 MG: 100 CAPSULE, LIQUID FILLED ORAL at 20:06

## 2018-04-25 RX ADMIN — ACETAMINOPHEN 650 MG: 325 TABLET ORAL at 12:06

## 2018-04-25 RX ADMIN — POLYETHYLENE GLYCOL 3350 17 G: 17 POWDER, FOR SOLUTION ORAL at 09:11

## 2018-04-25 RX ADMIN — ACETAMINOPHEN 650 MG: 325 TABLET ORAL at 09:11

## 2018-04-25 RX ADMIN — ASPIRIN 81 MG: 81 TABLET ORAL at 09:11

## 2018-04-25 RX ADMIN — BUSPIRONE HYDROCHLORIDE 15 MG: 15 TABLET ORAL at 20:06

## 2018-04-25 RX ADMIN — HYDROCODONE BITARTRATE AND ACETAMINOPHEN 1 TABLET: 5; 325 TABLET ORAL at 20:06

## 2018-04-25 RX ADMIN — METOPROLOL TARTRATE 50 MG: 50 TABLET, FILM COATED ORAL at 09:11

## 2018-04-26 ENCOUNTER — APPOINTMENT (OUTPATIENT)
Dept: CARDIOLOGY | Facility: HOSPITAL | Age: 83
End: 2018-04-26
Attending: SURGERY

## 2018-04-26 LAB
ANION GAP SERPL CALCULATED.3IONS-SCNC: 11 MMOL/L
BACTERIA SPEC AEROBE CULT: NORMAL
BH CV LOWER ARTERIAL LEFT ABI RATIO: 0.54
BH CV LOWER ARTERIAL LEFT DORSALIS PEDIS SYS MAX: 73 MMHG
BH CV LOWER ARTERIAL LEFT GREAT TOE SYS MAX: 32 MMHG
BH CV LOWER ARTERIAL LEFT POPLITEAL SYS MAX: 96 MMHG
BH CV LOWER ARTERIAL LEFT POST TIBIAL SYS MAX: 77 MMHG
BH CV LOWER ARTERIAL LEFT TBI RATIO: 0.22
BH CV LOWER ARTERIAL RIGHT ABI RATIO: 0.32
BH CV LOWER ARTERIAL RIGHT DORSALIS PEDIS SYS MAX: 46 MMHG
BH CV LOWER ARTERIAL RIGHT GREAT TOE SYS MAX: 0 MMHG
BH CV LOWER ARTERIAL RIGHT POPLITEAL SYS MAX: 70 MMHG
BH CV LOWER ARTERIAL RIGHT POST TIBIAL SYS MAX: 45 MMHG
BH CV LOWER ARTERIAL RIGHT TBI RATIO: 0
BUN BLD-MCNC: 18 MG/DL (ref 8–23)
BUN/CREAT SERPL: 25.4 (ref 7–25)
CALCIUM SPEC-SCNC: 9.5 MG/DL (ref 8.2–9.6)
CHLORIDE SERPL-SCNC: 91 MMOL/L (ref 98–107)
CO2 SERPL-SCNC: 29 MMOL/L (ref 22–29)
CREAT BLD-MCNC: 0.71 MG/DL (ref 0.57–1)
DEPRECATED RDW RBC AUTO: 47.2 FL (ref 37–54)
ERYTHROCYTE [DISTWIDTH] IN BLOOD BY AUTOMATED COUNT: 13.9 % (ref 11.7–13)
GFR SERPL CREATININE-BSD FRML MDRD: 77 ML/MIN/1.73
GLUCOSE BLD-MCNC: 194 MG/DL (ref 65–99)
HCT VFR BLD AUTO: 27.6 % (ref 35.6–45.5)
HGB BLD-MCNC: 8.9 G/DL (ref 11.9–15.5)
MCH RBC QN AUTO: 30.5 PG (ref 26.9–32)
MCHC RBC AUTO-ENTMCNC: 32.2 G/DL (ref 32.4–36.3)
MCV RBC AUTO: 94.5 FL (ref 80.5–98.2)
PLATELET # BLD AUTO: 206 10*3/MM3 (ref 140–500)
PMV BLD AUTO: 11.6 FL (ref 6–12)
POTASSIUM BLD-SCNC: 4.4 MMOL/L (ref 3.5–5.2)
RBC # BLD AUTO: 2.92 10*6/MM3 (ref 3.9–5.2)
SODIUM BLD-SCNC: 131 MMOL/L (ref 136–145)
UPPER ARTERIAL RIGHT ARM BRACHIAL SYS MAX: 143 MMHG
WBC NRBC COR # BLD: 13.16 10*3/MM3 (ref 4.5–10.7)

## 2018-04-26 PROCEDURE — 80048 BASIC METABOLIC PNL TOTAL CA: CPT | Performed by: HOSPITALIST

## 2018-04-26 PROCEDURE — 93923 UPR/LXTR ART STDY 3+ LVLS: CPT

## 2018-04-26 PROCEDURE — 97110 THERAPEUTIC EXERCISES: CPT

## 2018-04-26 PROCEDURE — 85027 COMPLETE CBC AUTOMATED: CPT | Performed by: HOSPITALIST

## 2018-04-26 RX ORDER — BISACODYL 10 MG
10 SUPPOSITORY, RECTAL RECTAL ONCE
Status: DISCONTINUED | OUTPATIENT
Start: 2018-04-26 | End: 2018-04-27 | Stop reason: HOSPADM

## 2018-04-26 RX ADMIN — HYDROCODONE BITARTRATE AND ACETAMINOPHEN 1 TABLET: 5; 325 TABLET ORAL at 22:42

## 2018-04-26 RX ADMIN — PANTOPRAZOLE SODIUM 40 MG: 40 TABLET, DELAYED RELEASE ORAL at 07:02

## 2018-04-26 RX ADMIN — BUSPIRONE HYDROCHLORIDE 15 MG: 15 TABLET ORAL at 21:37

## 2018-04-26 RX ADMIN — ASPIRIN 81 MG: 81 TABLET ORAL at 08:46

## 2018-04-26 RX ADMIN — ACETAMINOPHEN 650 MG: 325 TABLET ORAL at 08:46

## 2018-04-26 RX ADMIN — ACETAMINOPHEN 650 MG: 325 TABLET ORAL at 21:37

## 2018-04-26 RX ADMIN — BUSPIRONE HYDROCHLORIDE 15 MG: 15 TABLET ORAL at 08:46

## 2018-04-26 RX ADMIN — CLOPIDOGREL 75 MG: 75 TABLET, FILM COATED ORAL at 08:46

## 2018-04-26 RX ADMIN — METOPROLOL TARTRATE 50 MG: 50 TABLET, FILM COATED ORAL at 21:37

## 2018-04-26 RX ADMIN — POLYETHYLENE GLYCOL 3350 17 G: 17 POWDER, FOR SOLUTION ORAL at 08:46

## 2018-04-26 RX ADMIN — DOCUSATE SODIUM 100 MG: 100 CAPSULE, LIQUID FILLED ORAL at 21:37

## 2018-04-26 RX ADMIN — DOCUSATE SODIUM 100 MG: 100 CAPSULE, LIQUID FILLED ORAL at 08:46

## 2018-04-26 RX ADMIN — METOPROLOL TARTRATE 50 MG: 50 TABLET, FILM COATED ORAL at 08:46

## 2018-04-27 VITALS
SYSTOLIC BLOOD PRESSURE: 129 MMHG | BODY MASS INDEX: 26.19 KG/M2 | HEIGHT: 60 IN | DIASTOLIC BLOOD PRESSURE: 69 MMHG | OXYGEN SATURATION: 95 % | WEIGHT: 133.38 LBS | TEMPERATURE: 97.3 F | HEART RATE: 66 BPM | RESPIRATION RATE: 16 BRPM

## 2018-04-27 PROBLEM — D62 ACUTE POST-HEMORRHAGIC ANEMIA: Status: ACTIVE | Noted: 2018-04-27

## 2018-04-27 RX ORDER — BACLOFEN 10 MG/1
5 TABLET ORAL EVERY 8 HOURS SCHEDULED
Qty: 9 TABLET | Refills: 0 | Status: SHIPPED | OUTPATIENT
Start: 2018-04-27

## 2018-04-27 RX ORDER — HYDROCODONE BITARTRATE AND ACETAMINOPHEN 5; 325 MG/1; MG/1
1 TABLET ORAL EVERY 4 HOURS PRN
Qty: 20 TABLET | Refills: 0 | Status: SHIPPED | OUTPATIENT
Start: 2018-04-27 | End: 2018-05-05

## 2018-04-27 RX ORDER — CLOPIDOGREL BISULFATE 75 MG/1
75 TABLET ORAL DAILY
Qty: 30 TABLET | Refills: 0
Start: 2018-05-02

## 2018-04-27 RX ORDER — BACLOFEN 10 MG/1
5 TABLET ORAL EVERY 8 HOURS SCHEDULED
Status: DISCONTINUED | OUTPATIENT
Start: 2018-04-27 | End: 2018-04-27 | Stop reason: HOSPADM

## 2018-04-27 RX ADMIN — CLOPIDOGREL 75 MG: 75 TABLET, FILM COATED ORAL at 09:00

## 2018-04-27 RX ADMIN — HYDROCODONE BITARTRATE AND ACETAMINOPHEN 1 TABLET: 5; 325 TABLET ORAL at 09:00

## 2018-04-27 RX ADMIN — ASPIRIN 81 MG: 81 TABLET ORAL at 09:00

## 2018-04-27 RX ADMIN — BUSPIRONE HYDROCHLORIDE 15 MG: 15 TABLET ORAL at 09:00

## 2018-04-27 RX ADMIN — DOCUSATE SODIUM 100 MG: 100 CAPSULE, LIQUID FILLED ORAL at 09:00

## 2018-04-27 RX ADMIN — HYDROCODONE BITARTRATE AND ACETAMINOPHEN 1 TABLET: 5; 325 TABLET ORAL at 13:39

## 2018-04-27 RX ADMIN — BACLOFEN 5 MG: 10 TABLET ORAL at 12:52

## 2018-04-27 RX ADMIN — PANTOPRAZOLE SODIUM 40 MG: 40 TABLET, DELAYED RELEASE ORAL at 06:24

## 2018-04-27 RX ADMIN — METOPROLOL TARTRATE 50 MG: 50 TABLET, FILM COATED ORAL at 09:00

## 2024-07-07 NOTE — PLAN OF CARE
Problem: Patient Care Overview (Adult)  Goal: Plan of Care Review  Outcome: Ongoing (interventions implemented as appropriate)    Problem: Gastrointestinal Bleeding (Adult)  Goal: Signs and Symptoms of Listed Potential Problems Will be Absent or Manageable (Gastrointestinal Bleeding)  Outcome: Ongoing (interventions implemented as appropriate)    Problem: Fall Risk (Adult)  Goal: Identify Related Risk Factors and Signs and Symptoms  Outcome: Ongoing (interventions implemented as appropriate)  Goal: Absence of Falls  Outcome: Ongoing (interventions implemented as appropriate)       CBC:            15.0   7.08  )-----------( 223      ( 07-07-24 @ 00:06 )             39.9       Chem:         ( 07-07-24 @ 00:06 )    110<LL>  |  76<L>  |  16  ----------------------------<  72  5.1<H>   |  17  |  0.6<L>      Liver Functions: ( 07-07-24 @ 00:06 )  Alb: 5.0 g/dL / Pro: 7.7 g/dL / ALK PHOS: 68 U/L / ALT: 48 U/L / AST: 52 U/L / GGT: x         Type & Screen:   Bilirubin: (07-07-24 @ 00:06)  Direct: x  / Indirect: x  / Total: 1.1    TSH:   T4:

## (undated) DEVICE — 6617 IOBAN II PATIENT ISOLATION DRAPE 5/BX,4BX/CS: Brand: STERI-DRAPE™ IOBAN™ 2

## (undated) DEVICE — SPNG GZ WOVN 4X4IN 12PLY 10/BX STRL

## (undated) DEVICE — STPLR SKIN VISISTAT WD 35CT

## (undated) DEVICE — PAD,ABDOMINAL,8"X10",ST,LF: Brand: MEDLINE

## (undated) DEVICE — DRP C/ARMOR

## (undated) DEVICE — OCCLUSIVE GAUZE STRIP,3% BISMUTH TRIBROMOPHENATE IN PETROLATUM BLEND: Brand: XEROFORM

## (undated) DEVICE — ANTIBACTERIAL UNDYED BRAIDED (POLYGLACTIN 910), SYNTHETIC ABSORBABLE SUTURE: Brand: COATED VICRYL

## (undated) DEVICE — DRSNG SURESITE WNDW 4X4.5

## (undated) DEVICE — TRY SKINPREP DRYPREP

## (undated) DEVICE — GLV SURG BIOGEL LTX PF 8

## (undated) DEVICE — 3.0MM X 1000MM BALL TIP GUIDE ROD: Brand: TRIGEN

## (undated) DEVICE — ENCORE® LATEX ORTHO SIZE 8, STERILE LATEX POWDER-FREE SURGICAL GLOVE: Brand: ENCORE

## (undated) DEVICE — BANDAGE,GAUZE,BULKEE II,4.5"X4.1YD,STRL: Brand: MEDLINE

## (undated) DEVICE — MAT FLR ABSORBENT LG 4FT 10 2.5FT

## (undated) DEVICE — 4.0MM SHORT PILOT DRILL WITH AO CONNECTOR: Brand: TRIGEN

## (undated) DEVICE — BNDG ELAS CO-FLEX SLF ADHR 6IN 5YD LF STRL

## (undated) DEVICE — SUT VIC 0 CT1 36IN J946H

## (undated) DEVICE — GUIDE PIN 3.2MM X 343MM: Brand: TRIGEN

## (undated) DEVICE — APPL CHLORAPREP W/TINT 26ML ORNG

## (undated) DEVICE — PK HIP PINNING 40

## (undated) DEVICE — LEGGINGS, PAIR, CLEAR, STERILE: Brand: MEDLINE

## (undated) DEVICE — SOL ISO/ALC RUB 70PCT 4OZ